# Patient Record
Sex: MALE | Race: BLACK OR AFRICAN AMERICAN | NOT HISPANIC OR LATINO | URBAN - METROPOLITAN AREA
[De-identification: names, ages, dates, MRNs, and addresses within clinical notes are randomized per-mention and may not be internally consistent; named-entity substitution may affect disease eponyms.]

---

## 2018-11-12 ENCOUNTER — INPATIENT HOSPITAL (AMBULATORY)
Dept: URBAN - METROPOLITAN AREA HOSPITAL 84 | Facility: HOSPITAL | Age: 48
End: 2018-11-12

## 2018-11-12 DIAGNOSIS — R10.32 LEFT LOWER QUADRANT PAIN: ICD-10-CM

## 2018-11-12 DIAGNOSIS — K57.32 DIVERTICULITIS OF LARGE INTESTINE WITHOUT PERFORATION OR ABS: ICD-10-CM

## 2018-11-12 DIAGNOSIS — R93.3 ABNORMAL FINDINGS ON DIAGNOSTIC IMAGING OF OTHER PARTS OF DI: ICD-10-CM

## 2018-11-12 DIAGNOSIS — Z87.19 PERSONAL HISTORY OF OTHER DISEASES OF THE DIGESTIVE SYSTEM: ICD-10-CM

## 2018-11-12 PROCEDURE — 99252 IP/OBS CONSLTJ NEW/EST SF 35: CPT | Performed by: NURSE PRACTITIONER

## 2018-11-13 ENCOUNTER — INPATIENT HOSPITAL (AMBULATORY)
Dept: URBAN - METROPOLITAN AREA HOSPITAL 84 | Facility: HOSPITAL | Age: 48
End: 2018-11-13

## 2018-11-13 DIAGNOSIS — K57.32 DIVERTICULITIS OF LARGE INTESTINE WITHOUT PERFORATION OR ABS: ICD-10-CM

## 2018-11-13 DIAGNOSIS — R10.32 LEFT LOWER QUADRANT PAIN: ICD-10-CM

## 2018-11-13 DIAGNOSIS — R93.3 ABNORMAL FINDINGS ON DIAGNOSTIC IMAGING OF OTHER PARTS OF DI: ICD-10-CM

## 2018-11-13 DIAGNOSIS — Z87.19 PERSONAL HISTORY OF OTHER DISEASES OF THE DIGESTIVE SYSTEM: ICD-10-CM

## 2018-11-13 PROCEDURE — 99231 SBSQ HOSP IP/OBS SF/LOW 25: CPT | Performed by: NURSE PRACTITIONER

## 2020-01-18 ENCOUNTER — HOSPITAL ENCOUNTER (EMERGENCY)
Facility: HOSPITAL | Age: 50
Discharge: HOME | End: 2020-01-18
Attending: EMERGENCY MEDICINE | Admitting: EMERGENCY MEDICINE

## 2020-01-18 ENCOUNTER — APPOINTMENT (OUTPATIENT)
Dept: CARDIOLOGY | Facility: HOSPITAL | Age: 50
End: 2020-01-18

## 2020-01-18 VITALS
RESPIRATION RATE: 18 BRPM | HEART RATE: 89 BPM | DIASTOLIC BLOOD PRESSURE: 95 MMHG | HEIGHT: 75 IN | WEIGHT: 297.4 LBS | BODY MASS INDEX: 36.98 KG/M2 | TEMPERATURE: 98.6 F | OXYGEN SATURATION: 98 % | SYSTOLIC BLOOD PRESSURE: 148 MMHG

## 2020-01-18 DIAGNOSIS — L02.415 ABSCESS OF RIGHT LEG: Primary | ICD-10-CM

## 2020-01-18 LAB
ANION GAP SERPL CALCULATED.3IONS-SCNC: 12 MMOL/L (ref 5–15)
APTT PPP: 28.7 SECONDS (ref 24–31)
BASOPHILS # BLD AUTO: 0.1 10*3/MM3 (ref 0–0.2)
BASOPHILS NFR BLD AUTO: 0.7 % (ref 0–1.5)
BH CV LOWER VASCULAR LEFT COMMON FEMORAL AUGMENT: NORMAL
BH CV LOWER VASCULAR LEFT COMMON FEMORAL COMPETENT: NORMAL
BH CV LOWER VASCULAR LEFT COMMON FEMORAL COMPRESS: NORMAL
BH CV LOWER VASCULAR LEFT COMMON FEMORAL PHASIC: NORMAL
BH CV LOWER VASCULAR LEFT COMMON FEMORAL SPONT: NORMAL
BH CV LOWER VASCULAR RIGHT COMMON FEMORAL AUGMENT: NORMAL
BH CV LOWER VASCULAR RIGHT COMMON FEMORAL COMPETENT: NORMAL
BH CV LOWER VASCULAR RIGHT COMMON FEMORAL COMPRESS: NORMAL
BH CV LOWER VASCULAR RIGHT COMMON FEMORAL PHASIC: NORMAL
BH CV LOWER VASCULAR RIGHT COMMON FEMORAL SPONT: NORMAL
BH CV LOWER VASCULAR RIGHT DISTAL FEMORAL COMPRESS: NORMAL
BH CV LOWER VASCULAR RIGHT GASTRONEMIUS COMPRESS: NORMAL
BH CV LOWER VASCULAR RIGHT GREATER SAPH AK COMPRESS: NORMAL
BH CV LOWER VASCULAR RIGHT GREATER SAPH BK COMPRESS: NORMAL
BH CV LOWER VASCULAR RIGHT LESSER SAPH COMPRESS: NORMAL
BH CV LOWER VASCULAR RIGHT MID FEMORAL AUGMENT: NORMAL
BH CV LOWER VASCULAR RIGHT MID FEMORAL COMPETENT: NORMAL
BH CV LOWER VASCULAR RIGHT MID FEMORAL COMPRESS: NORMAL
BH CV LOWER VASCULAR RIGHT MID FEMORAL PHASIC: NORMAL
BH CV LOWER VASCULAR RIGHT MID FEMORAL SPONT: NORMAL
BH CV LOWER VASCULAR RIGHT PERONEAL COMPRESS: NORMAL
BH CV LOWER VASCULAR RIGHT POPLITEAL AUGMENT: NORMAL
BH CV LOWER VASCULAR RIGHT POPLITEAL COMPETENT: NORMAL
BH CV LOWER VASCULAR RIGHT POPLITEAL COMPRESS: NORMAL
BH CV LOWER VASCULAR RIGHT POPLITEAL PHASIC: NORMAL
BH CV LOWER VASCULAR RIGHT POPLITEAL SPONT: NORMAL
BH CV LOWER VASCULAR RIGHT POSTERIOR TIBIAL COMPRESS: NORMAL
BH CV LOWER VASCULAR RIGHT PROXIMAL FEMORAL COMPRESS: NORMAL
BH CV LOWER VASCULAR RIGHT SAPHENOFEMORAL JUNCTION AUGMENT: NORMAL
BH CV LOWER VASCULAR RIGHT SAPHENOFEMORAL JUNCTION COMPETENT: NORMAL
BH CV LOWER VASCULAR RIGHT SAPHENOFEMORAL JUNCTION COMPRESS: NORMAL
BH CV LOWER VASCULAR RIGHT SAPHENOFEMORAL JUNCTION PHASIC: NORMAL
BH CV LOWER VASCULAR RIGHT SAPHENOFEMORAL JUNCTION SPONT: NORMAL
BUN BLD-MCNC: 13 MG/DL (ref 6–20)
BUN/CREAT SERPL: 11.5 (ref 7–25)
CALCIUM SPEC-SCNC: 9.4 MG/DL (ref 8.6–10.5)
CHLORIDE SERPL-SCNC: 103 MMOL/L (ref 98–107)
CO2 SERPL-SCNC: 26 MMOL/L (ref 22–29)
CREAT BLD-MCNC: 1.13 MG/DL (ref 0.76–1.27)
D DIMER PPP FEU-MCNC: 0.69 MCGFEU/ML (ref 0.17–0.59)
DEPRECATED RDW RBC AUTO: 43.8 FL (ref 37–54)
EOSINOPHIL # BLD AUTO: 0.1 10*3/MM3 (ref 0–0.4)
EOSINOPHIL NFR BLD AUTO: 1.1 % (ref 0.3–6.2)
ERYTHROCYTE [DISTWIDTH] IN BLOOD BY AUTOMATED COUNT: 14.1 % (ref 12.3–15.4)
GFR SERPL CREATININE-BSD FRML MDRD: 83 ML/MIN/1.73
GLUCOSE BLD-MCNC: 111 MG/DL (ref 65–99)
HCT VFR BLD AUTO: 43 % (ref 37.5–51)
HGB BLD-MCNC: 14.2 G/DL (ref 13–17.7)
INR PPP: 0.93 (ref 0.9–1.1)
LYMPHOCYTES # BLD AUTO: 2.5 10*3/MM3 (ref 0.7–3.1)
LYMPHOCYTES NFR BLD AUTO: 26.5 % (ref 19.6–45.3)
MCH RBC QN AUTO: 29.5 PG (ref 26.6–33)
MCHC RBC AUTO-ENTMCNC: 33.1 G/DL (ref 31.5–35.7)
MCV RBC AUTO: 89.2 FL (ref 79–97)
MONOCYTES # BLD AUTO: 0.7 10*3/MM3 (ref 0.1–0.9)
MONOCYTES NFR BLD AUTO: 7.3 % (ref 5–12)
NEUTROPHILS # BLD AUTO: 6.2 10*3/MM3 (ref 1.7–7)
NEUTROPHILS NFR BLD AUTO: 64.4 % (ref 42.7–76)
NRBC BLD AUTO-RTO: 0.5 /100 WBC (ref 0–0.2)
PLATELET # BLD AUTO: 294 10*3/MM3 (ref 140–450)
PMV BLD AUTO: 7.9 FL (ref 6–12)
POTASSIUM BLD-SCNC: 4.1 MMOL/L (ref 3.5–5.2)
PROTHROMBIN TIME: 9.9 SECONDS (ref 9.6–11.7)
RBC # BLD AUTO: 4.82 10*6/MM3 (ref 4.14–5.8)
SODIUM BLD-SCNC: 141 MMOL/L (ref 136–145)
WBC NRBC COR # BLD: 9.6 10*3/MM3 (ref 3.4–10.8)

## 2020-01-18 PROCEDURE — 80048 BASIC METABOLIC PNL TOTAL CA: CPT | Performed by: EMERGENCY MEDICINE

## 2020-01-18 PROCEDURE — 85379 FIBRIN DEGRADATION QUANT: CPT | Performed by: EMERGENCY MEDICINE

## 2020-01-18 PROCEDURE — 85025 COMPLETE CBC W/AUTO DIFF WBC: CPT | Performed by: EMERGENCY MEDICINE

## 2020-01-18 PROCEDURE — 85610 PROTHROMBIN TIME: CPT | Performed by: EMERGENCY MEDICINE

## 2020-01-18 PROCEDURE — 87205 SMEAR GRAM STAIN: CPT | Performed by: EMERGENCY MEDICINE

## 2020-01-18 PROCEDURE — 87070 CULTURE OTHR SPECIMN AEROBIC: CPT | Performed by: EMERGENCY MEDICINE

## 2020-01-18 PROCEDURE — 93971 EXTREMITY STUDY: CPT

## 2020-01-18 PROCEDURE — 85730 THROMBOPLASTIN TIME PARTIAL: CPT | Performed by: EMERGENCY MEDICINE

## 2020-01-18 PROCEDURE — 99283 EMERGENCY DEPT VISIT LOW MDM: CPT

## 2020-01-18 RX ORDER — CLINDAMYCIN HYDROCHLORIDE 300 MG/1
300 CAPSULE ORAL 3 TIMES DAILY
Qty: 30 CAPSULE | Refills: 0 | OUTPATIENT
Start: 2020-01-18 | End: 2020-12-28

## 2020-01-18 NOTE — ED NOTES
Called vascular and left a voicemail that this patient needs a RLE doppler as soon as the dept opens.     Lara Cabrera, RegSched Rep  01/18/20 0704

## 2020-01-18 NOTE — ED NOTES
Dr. Edmond performed I and D to RLE.  Packing placed,  Dressing applied.     Romana Montes RN  01/18/20 8591

## 2020-01-18 NOTE — DISCHARGE INSTRUCTIONS
Return to the ER for any worsening symptoms.  Initiate course of antibiotics for probable abscess.

## 2020-01-18 NOTE — ED PROVIDER NOTES
Subjective   Pleasant 49-year-old male complains of right medial ankle swelling increased for the past several days.  Patient did bump it on a nightstand several weeks ago with mild hematoma but did not develop any significant pain or redness or warmth until the past several days.  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  Pain in right leg is moderate, worse with ambulation.  No fevers.          Review of Systems   Musculoskeletal:        As per HPI   Skin: Positive for rash.   All other systems reviewed and are negative.      No past medical history on file.    Allergies   Allergen Reactions   • Codeine Other (See Comments)     Hypotension, tachycardia       No past surgical history on file.    No family history on file.    Social History     Socioeconomic History   • Marital status:      Spouse name: Not on file   • Number of children: Not on file   • Years of education: Not on file   • Highest education level: Not on file           Objective   Physical Exam   Constitutional: He is oriented to person, place, and time. He appears well-developed and well-nourished.   HENT:   Head: Normocephalic and atraumatic.   Mouth/Throat: Oropharynx is clear and moist.   Eyes: Pupils are equal, round, and reactive to light. Conjunctivae are normal.   Neck: Normal range of motion. Neck supple.   Cardiovascular: Normal rate, regular rhythm, normal heart sounds and intact distal pulses.   Pulmonary/Chest: Effort normal and breath sounds normal.   Musculoskeletal:   Right medial distal leg just above ankle with mild swelling, erythema, warmth, no wound, tender to palpation.  Negative Homans bilaterally   Neurological: He is alert and oriented to person, place, and time.   Motor and sensation intact   Skin: Skin is warm and dry. Capillary refill takes less than 2 seconds.   Psychiatric: He has a normal mood and affect. His  behavior is normal.       Incision & Drainage  Date/Time: 1/18/2020 6:50 AM  Performed by: Vinh Edmond MD  Authorized by: Vinh Edmond MD     Consent:     Consent obtained:  Verbal    Alternatives discussed:  Delayed treatment  Location:     Type:  Abscess    Location:  Lower extremity    Lower extremity location:  Leg    Leg location:  R lower leg  Pre-procedure details:     Skin preparation:  Hibiclens  Anesthesia (see MAR for exact dosages):     Anesthesia method:  Local infiltration    Local anesthetic:  Lidocaine 1% WITH epi  Procedure type:     Complexity:  Simple  Procedure details:     Needle aspiration: yes      Needle size:  18 G    Incision types:  Single straight    Incision depth:  Subcutaneous    Scalpel blade:  11    Wound management:  Probed and deloculated    Drainage:  Purulent and bloody    Drainage amount:  Scant    Wound treatment:  Drain placed    Packing materials:  1/4 in iodoform gauze  Post-procedure details:     Patient tolerance of procedure:  Tolerated well, no immediate complications               ED Course  ED Course as of Jan 18 0831   Sat Jan 18, 2020   0723 Duplex US pending at time care was transferred to yanelis HOFFMAN at 0730    [MG]      ED Course User Index  [MG] Yanelis Johnson PA-C    No radiology results for the last day  Labs Reviewed   BASIC METABOLIC PANEL - Abnormal; Notable for the following components:       Result Value    Glucose 111 (*)     All other components within normal limits    Narrative:     GFR Normal >60  Chronic Kidney Disease <60  Kidney Failure <15     D-DIMER, QUANTITATIVE - Abnormal; Notable for the following components:    D-Dimer, Quantitative 0.69 (*)     All other components within normal limits    Narrative:     Reference Range  --------------------------------------------------------------------     < 0.50   Negative Predictive Value  0.50-0.59   Indeterminate    >= 0.60   Probable VTE             A very low percentage of patients with  DVT may yield D-Dimer results   below the cut-off of 0.50 MCGFEU/mL.  This is known to be more   prevalent in patients with distal DVT.             Results of this test should always be interpreted in conjunction with   the patient's medical history, clinical presentation and other   findings.  Clinical diagnosis should not be based on the result of   INNOVANCE D-Dimer alone.   CBC WITH AUTO DIFFERENTIAL - Abnormal; Notable for the following components:    nRBC 0.5 (*)     All other components within normal limits   PROTIME-INR - Normal   APTT - Normal   WOUND CULTURE   CBC AND DIFFERENTIAL    Narrative:     The following orders were created for panel order CBC & Differential.  Procedure                               Abnormality         Status                     ---------                               -----------         ------                     CBC Auto Differential[[id number]]        Abnormal            Final result                 Please view results for these tests on the individual orders.     Medications - No data to display                                             MDM  Number of Diagnoses or Management Options  Abscess of right leg:   Diagnosis management comments: DISPOSITION:   Chart Review:  Comorbidity:  has no past medical history on file.  Differentials:this list is not all inclusive and does not constitute the entirety of considered causes --> abscess, DVT, cellulitis  Labs:  Glucose 111, d-dmer elevated, CBC unremarkable,     Imaging: Was interpreted by physician and reviewed by myself:  No radiology results for the last day    Disposition/Treatment:  49-year-old male who presents to the ER with leg swelling.  He is a long-distance .  Care was assumed from Dr. Edmond at 7:30 AM.  Pending ultrasound results which were negative for DVT.  Patient was discharged home with antibiotics for probable abscess.  He was stable at time of discharge return precaution follow-up instruction provided  he was in agreement with plan       Amount and/or Complexity of Data Reviewed  Clinical lab tests: reviewed    Patient Progress  Patient progress: stable      Final diagnoses:   Abscess of right leg            Sepideh Johnson PA-C  01/18/20 0878

## 2020-01-18 NOTE — ED NOTES
Patient complains of pain and swelling to RLE.  Patient has redness noted to R calf.  States he hit his leg about 1 month ago in the red area.  States redness and pain became worse this week.  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

## 2020-01-21 LAB
BACTERIA SPEC AEROBE CULT: NORMAL
GRAM STN SPEC: NORMAL
GRAM STN SPEC: NORMAL

## 2020-12-28 ENCOUNTER — APPOINTMENT (OUTPATIENT)
Dept: CT IMAGING | Facility: HOSPITAL | Age: 50
End: 2020-12-28

## 2020-12-28 ENCOUNTER — HOSPITAL ENCOUNTER (EMERGENCY)
Facility: HOSPITAL | Age: 50
Discharge: HOME | End: 2020-12-28
Attending: EMERGENCY MEDICINE | Admitting: EMERGENCY MEDICINE

## 2020-12-28 VITALS
HEART RATE: 79 BPM | OXYGEN SATURATION: 100 % | BODY MASS INDEX: 35.03 KG/M2 | HEIGHT: 75 IN | RESPIRATION RATE: 18 BRPM | SYSTOLIC BLOOD PRESSURE: 122 MMHG | DIASTOLIC BLOOD PRESSURE: 86 MMHG | WEIGHT: 281.75 LBS | TEMPERATURE: 98 F

## 2020-12-28 DIAGNOSIS — K57.92 DIVERTICULITIS: Primary | ICD-10-CM

## 2020-12-28 LAB
ALBUMIN SERPL-MCNC: 4.4 G/DL (ref 3.5–5.2)
ALBUMIN/GLOB SERPL: 1.8 G/DL
ALP SERPL-CCNC: 42 U/L (ref 39–117)
ALT SERPL W P-5'-P-CCNC: 18 U/L (ref 1–41)
ANION GAP SERPL CALCULATED.3IONS-SCNC: 11 MMOL/L (ref 5–15)
AST SERPL-CCNC: 19 U/L (ref 1–40)
BASOPHILS # BLD AUTO: 0.1 10*3/MM3 (ref 0–0.2)
BASOPHILS NFR BLD AUTO: 0.7 % (ref 0–1.5)
BILIRUB SERPL-MCNC: 0.3 MG/DL (ref 0–1.2)
BILIRUB UR QL STRIP: NEGATIVE
BUN SERPL-MCNC: 10 MG/DL (ref 6–20)
BUN/CREAT SERPL: 9.9 (ref 7–25)
CALCIUM SPEC-SCNC: 9.3 MG/DL (ref 8.6–10.5)
CHLORIDE SERPL-SCNC: 103 MMOL/L (ref 98–107)
CLARITY UR: CLEAR
CO2 SERPL-SCNC: 26 MMOL/L (ref 22–29)
COLOR UR: YELLOW
CREAT SERPL-MCNC: 1.01 MG/DL (ref 0.76–1.27)
DEPRECATED RDW RBC AUTO: 43.3 FL (ref 37–54)
EOSINOPHIL # BLD AUTO: 0.1 10*3/MM3 (ref 0–0.4)
EOSINOPHIL NFR BLD AUTO: 1.1 % (ref 0.3–6.2)
ERYTHROCYTE [DISTWIDTH] IN BLOOD BY AUTOMATED COUNT: 14.2 % (ref 12.3–15.4)
GFR SERPL CREATININE-BSD FRML MDRD: 94 ML/MIN/1.73
GLOBULIN UR ELPH-MCNC: 2.4 GM/DL
GLUCOSE SERPL-MCNC: 107 MG/DL (ref 65–99)
GLUCOSE UR STRIP-MCNC: NEGATIVE MG/DL
HCT VFR BLD AUTO: 43.8 % (ref 37.5–51)
HGB BLD-MCNC: 14.5 G/DL (ref 13–17.7)
HGB UR QL STRIP.AUTO: NEGATIVE
HOLD SPECIMEN: NORMAL
KETONES UR QL STRIP: NEGATIVE
LEUKOCYTE ESTERASE UR QL STRIP.AUTO: NEGATIVE
LIPASE SERPL-CCNC: 10 U/L (ref 13–60)
LYMPHOCYTES # BLD AUTO: 3.2 10*3/MM3 (ref 0.7–3.1)
LYMPHOCYTES NFR BLD AUTO: 42.1 % (ref 19.6–45.3)
MCH RBC QN AUTO: 29.3 PG (ref 26.6–33)
MCHC RBC AUTO-ENTMCNC: 33.1 G/DL (ref 31.5–35.7)
MCV RBC AUTO: 88.4 FL (ref 79–97)
MONOCYTES # BLD AUTO: 0.5 10*3/MM3 (ref 0.1–0.9)
MONOCYTES NFR BLD AUTO: 6.8 % (ref 5–12)
NEUTROPHILS NFR BLD AUTO: 3.7 10*3/MM3 (ref 1.7–7)
NEUTROPHILS NFR BLD AUTO: 49.3 % (ref 42.7–76)
NITRITE UR QL STRIP: NEGATIVE
NRBC BLD AUTO-RTO: 0.1 /100 WBC (ref 0–0.2)
PH UR STRIP.AUTO: 7 [PH] (ref 5–8)
PLATELET # BLD AUTO: 259 10*3/MM3 (ref 140–450)
PMV BLD AUTO: 7.7 FL (ref 6–12)
POTASSIUM SERPL-SCNC: 4 MMOL/L (ref 3.5–5.2)
PROT SERPL-MCNC: 6.8 G/DL (ref 6–8.5)
PROT UR QL STRIP: NEGATIVE
RBC # BLD AUTO: 4.95 10*6/MM3 (ref 4.14–5.8)
SODIUM SERPL-SCNC: 140 MMOL/L (ref 136–145)
SP GR UR STRIP: 1.02 (ref 1–1.03)
UROBILINOGEN UR QL STRIP: NORMAL
WBC # BLD AUTO: 7.5 10*3/MM3 (ref 3.4–10.8)
WHOLE BLOOD HOLD SPECIMEN: NORMAL

## 2020-12-28 PROCEDURE — 74177 CT ABD & PELVIS W/CONTRAST: CPT

## 2020-12-28 PROCEDURE — 96375 TX/PRO/DX INJ NEW DRUG ADDON: CPT

## 2020-12-28 PROCEDURE — 83690 ASSAY OF LIPASE: CPT | Performed by: EMERGENCY MEDICINE

## 2020-12-28 PROCEDURE — 99283 EMERGENCY DEPT VISIT LOW MDM: CPT

## 2020-12-28 PROCEDURE — 25010000002 MORPHINE PER 10 MG

## 2020-12-28 PROCEDURE — 96374 THER/PROPH/DIAG INJ IV PUSH: CPT

## 2020-12-28 PROCEDURE — 25010000002 MORPHINE PER 10 MG: Performed by: PHYSICIAN ASSISTANT

## 2020-12-28 PROCEDURE — 81003 URINALYSIS AUTO W/O SCOPE: CPT | Performed by: EMERGENCY MEDICINE

## 2020-12-28 PROCEDURE — 85025 COMPLETE CBC W/AUTO DIFF WBC: CPT | Performed by: EMERGENCY MEDICINE

## 2020-12-28 PROCEDURE — 96376 TX/PRO/DX INJ SAME DRUG ADON: CPT

## 2020-12-28 PROCEDURE — 25010000002 ONDANSETRON PER 1 MG: Performed by: EMERGENCY MEDICINE

## 2020-12-28 PROCEDURE — 80053 COMPREHEN METABOLIC PANEL: CPT | Performed by: EMERGENCY MEDICINE

## 2020-12-28 PROCEDURE — 0 IOPAMIDOL PER 1 ML: Performed by: EMERGENCY MEDICINE

## 2020-12-28 RX ORDER — AMOXICILLIN AND CLAVULANATE POTASSIUM 875; 125 MG/1; MG/1
1 TABLET, FILM COATED ORAL 3 TIMES DAILY
Qty: 21 TABLET | Refills: 0 | Status: SHIPPED | OUTPATIENT
Start: 2020-12-28 | End: 2021-01-04

## 2020-12-28 RX ORDER — MORPHINE SULFATE 4 MG/ML
4 INJECTION, SOLUTION INTRAMUSCULAR; INTRAVENOUS ONCE
Status: COMPLETED | OUTPATIENT
Start: 2020-12-28 | End: 2020-12-28

## 2020-12-28 RX ORDER — ONDANSETRON 4 MG/1
4 TABLET, ORALLY DISINTEGRATING ORAL EVERY 8 HOURS PRN
Qty: 20 TABLET | Refills: 0 | OUTPATIENT
Start: 2020-12-28 | End: 2021-07-06

## 2020-12-28 RX ORDER — MORPHINE SULFATE 4 MG/ML
INJECTION, SOLUTION INTRAMUSCULAR; INTRAVENOUS
Status: COMPLETED
Start: 2020-12-28 | End: 2020-12-28

## 2020-12-28 RX ORDER — MORPHINE SULFATE 4 MG/ML
2 INJECTION, SOLUTION INTRAMUSCULAR; INTRAVENOUS ONCE
Status: COMPLETED | OUTPATIENT
Start: 2020-12-28 | End: 2020-12-28

## 2020-12-28 RX ORDER — ONDANSETRON 2 MG/ML
4 INJECTION INTRAMUSCULAR; INTRAVENOUS ONCE
Status: COMPLETED | OUTPATIENT
Start: 2020-12-28 | End: 2020-12-28

## 2020-12-28 RX ORDER — SODIUM CHLORIDE 0.9 % (FLUSH) 0.9 %
10 SYRINGE (ML) INJECTION AS NEEDED
Status: DISCONTINUED | OUTPATIENT
Start: 2020-12-28 | End: 2020-12-28 | Stop reason: HOSPADM

## 2020-12-28 RX ORDER — HYDROCODONE BITARTRATE AND ACETAMINOPHEN 5; 325 MG/1; MG/1
1 TABLET ORAL EVERY 6 HOURS PRN
Qty: 8 TABLET | Refills: 0 | OUTPATIENT
Start: 2020-12-28 | End: 2021-07-06

## 2020-12-28 RX ADMIN — MORPHINE SULFATE 2 MG: 4 INJECTION INTRAVENOUS at 08:29

## 2020-12-28 RX ADMIN — ONDANSETRON 4 MG: 2 INJECTION, SOLUTION INTRAMUSCULAR; INTRAVENOUS at 05:49

## 2020-12-28 RX ADMIN — IOPAMIDOL 100 ML: 755 INJECTION, SOLUTION INTRAVENOUS at 07:25

## 2020-12-28 RX ADMIN — MORPHINE SULFATE 4 MG: 4 INJECTION INTRAVENOUS at 05:49

## 2020-12-28 RX ADMIN — MORPHINE SULFATE 4 MG: 4 INJECTION, SOLUTION INTRAMUSCULAR; INTRAVENOUS at 05:49

## 2020-12-28 NOTE — DISCHARGE INSTRUCTIONS
Return to the ER for any worsening symptoms.   Follow up with your primary care provider for any further work up and pain management  May also follow up with GI for further management

## 2020-12-28 NOTE — ED PROVIDER NOTES
Subjective   50-year-old male with a history of recurrent diverticulitis complains of suspected flare with moderate left lower quadrant pain, nausea, nonbloody diarrhea for the past 1 week.  Pain worse with movement.          Review of Systems   Gastrointestinal: Positive for abdominal pain, diarrhea and nausea.   All other systems reviewed and are negative.      No past medical history on file.    Allergies   Allergen Reactions   • Codeine Other (See Comments)     Hypotension, tachycardia       No past surgical history on file.    No family history on file.    Social History     Socioeconomic History   • Marital status:      Spouse name: Not on file   • Number of children: Not on file   • Years of education: Not on file   • Highest education level: Not on file           Objective   Physical Exam  Constitutional:       Appearance: He is well-developed.   HENT:      Head: Normocephalic and atraumatic.      Mouth/Throat:      Mouth: Mucous membranes are moist.      Pharynx: Oropharynx is clear.   Eyes:      Conjunctiva/sclera: Conjunctivae normal.      Pupils: Pupils are equal, round, and reactive to light.   Neck:      Musculoskeletal: Normal range of motion and neck supple.   Cardiovascular:      Rate and Rhythm: Normal rate and regular rhythm.   Pulmonary:      Effort: Pulmonary effort is normal.      Breath sounds: Normal breath sounds.   Abdominal:      General: Bowel sounds are normal. There is no distension.      Palpations: Abdomen is soft.      Comments: Tenderness to palpation left lower abdomen, no rebound or guarding   Musculoskeletal: Normal range of motion.         General: No swelling or tenderness.   Skin:     General: Skin is warm and dry.      Capillary Refill: Capillary refill takes less than 2 seconds.   Neurological:      General: No focal deficit present.      Mental Status: He is alert and oriented to person, place, and time.   Psychiatric:         Mood and Affect: Mood normal.          Behavior: Behavior normal.         Procedures           ED Course  ED Course as of Dec 28 0824   Mon Dec 28, 2020   0736 Care transferred to myself pending CT of abdomen pelvis. Lab work stable. Patient resting comfortably in room at this time. Agree with the above assessment     [MG]   0823 Inspect performed and negative for [place]/kY    [MG]      ED Course User Index  [MG] Johnson Sepideh LORE, CAROLYNE         abs  Medications   sodium chloride 0.9 % flush 10 mL (has no administration in time range)   Morphine sulfate (PF) injection 2 mg (has no administration in time range)   Morphine sulfate (PF) injection 4 mg (4 mg Intravenous Given 12/28/20 0549)   ondansetron (ZOFRAN) injection 4 mg (4 mg Intravenous Given 12/28/20 0549)   iopamidol (ISOVUE-370) 76 % injection 100 mL (100 mL Intravenous Given 12/28/20 0725)                                       MDM  Number of Diagnoses or Management Options  Diverticulitis:   Diagnosis management comments: I examined the patient using the appropriate personal protective equipment.      DISPOSITION:   Chart Review:  Comorbidity:  has no past medical history on file.    Imaging: Was interpreted by physician and reviewed by myself:  Ct Abdomen Pelvis With Contrast    Result Date: 12/28/2020  1. Mild acute sigmoid diverticulitis without evidence of perforation or abscess. 2. Small recurrent ventral hernia to the left and superior to patient's mesh repair.    Electronically Signed By-Eric Hodge MD On:12/28/2020 8:01 AM This report was finalized on [id number] by  Eric Hodge MD.      Disposition/Treatment:    50-year-old male presents to the ER left lower quadrant pain history of diverticulitis patient's lab work was normal he does have mild diverticulitis on CT scan.  I discussed with patient trial course outpatient therapy he was amendable to plan.  He was given Augmentin antinausea medication and pain medication told return to the ER for any worsening symptoms he was  stable at time of discharge return precaution follow-up return provided he was stable and in agreement with plan    Assumed care from Dr. Edmond initial lab work already obtained pending CT results.  I agree with his above assessment including HPI ROS and physical exam patient was resting comfortably when I did give him his results.       Amount and/or Complexity of Data Reviewed  Clinical lab tests: reviewed  Tests in the radiology section of CPT®: reviewed    Patient Progress  Patient progress: stable      Final diagnoses:   Diverticulitis            Sepideh Johnson PA-C  12/28/20 0825       Vinh Edmond MD  12/29/20 0842

## 2021-07-06 ENCOUNTER — HOSPITAL ENCOUNTER (EMERGENCY)
Facility: HOSPITAL | Age: 51
Discharge: HOME | End: 2021-07-06
Attending: EMERGENCY MEDICINE | Admitting: EMERGENCY MEDICINE

## 2021-07-06 ENCOUNTER — APPOINTMENT (OUTPATIENT)
Dept: CT IMAGING | Facility: HOSPITAL | Age: 51
End: 2021-07-06

## 2021-07-06 VITALS
BODY MASS INDEX: 36.1 KG/M2 | DIASTOLIC BLOOD PRESSURE: 90 MMHG | OXYGEN SATURATION: 100 % | HEART RATE: 90 BPM | WEIGHT: 290.35 LBS | HEIGHT: 75 IN | RESPIRATION RATE: 16 BRPM | SYSTOLIC BLOOD PRESSURE: 136 MMHG | TEMPERATURE: 98.6 F

## 2021-07-06 DIAGNOSIS — K57.92 DIVERTICULITIS: Primary | ICD-10-CM

## 2021-07-06 LAB
ALBUMIN SERPL-MCNC: 3.9 G/DL (ref 3.5–5.2)
ALBUMIN/GLOB SERPL: 1.7 G/DL
ALP SERPL-CCNC: 46 U/L (ref 39–117)
ALT SERPL W P-5'-P-CCNC: 15 U/L (ref 1–41)
ANION GAP SERPL CALCULATED.3IONS-SCNC: 8 MMOL/L (ref 5–15)
AST SERPL-CCNC: 19 U/L (ref 1–40)
BASOPHILS # BLD AUTO: 0.1 10*3/MM3 (ref 0–0.2)
BASOPHILS NFR BLD AUTO: 1.2 % (ref 0–1.5)
BILIRUB SERPL-MCNC: 0.4 MG/DL (ref 0–1.2)
BILIRUB UR QL STRIP: NEGATIVE
BUN SERPL-MCNC: 8 MG/DL (ref 6–20)
BUN/CREAT SERPL: 7.3 (ref 7–25)
CALCIUM SPEC-SCNC: 8.6 MG/DL (ref 8.6–10.5)
CHLORIDE SERPL-SCNC: 106 MMOL/L (ref 98–107)
CLARITY UR: CLEAR
CO2 SERPL-SCNC: 28 MMOL/L (ref 22–29)
COLOR UR: ABNORMAL
CREAT SERPL-MCNC: 1.09 MG/DL (ref 0.76–1.27)
DEPRECATED RDW RBC AUTO: 42.4 FL (ref 37–54)
EOSINOPHIL # BLD AUTO: 0.1 10*3/MM3 (ref 0–0.4)
EOSINOPHIL NFR BLD AUTO: 1.3 % (ref 0.3–6.2)
ERYTHROCYTE [DISTWIDTH] IN BLOOD BY AUTOMATED COUNT: 13.8 % (ref 12.3–15.4)
GFR SERPL CREATININE-BSD FRML MDRD: 86 ML/MIN/1.73
GLOBULIN UR ELPH-MCNC: 2.3 GM/DL
GLUCOSE SERPL-MCNC: 121 MG/DL (ref 65–99)
GLUCOSE UR STRIP-MCNC: NEGATIVE MG/DL
HCT VFR BLD AUTO: 42.5 % (ref 37.5–51)
HGB BLD-MCNC: 14.2 G/DL (ref 13–17.7)
HGB UR QL STRIP.AUTO: NEGATIVE
KETONES UR QL STRIP: NEGATIVE
LEUKOCYTE ESTERASE UR QL STRIP.AUTO: NEGATIVE
LIPASE SERPL-CCNC: 7 U/L (ref 13–60)
LYMPHOCYTES # BLD AUTO: 2.8 10*3/MM3 (ref 0.7–3.1)
LYMPHOCYTES NFR BLD AUTO: 39.9 % (ref 19.6–45.3)
MCH RBC QN AUTO: 29.2 PG (ref 26.6–33)
MCHC RBC AUTO-ENTMCNC: 33.3 G/DL (ref 31.5–35.7)
MCV RBC AUTO: 87.5 FL (ref 79–97)
MONOCYTES # BLD AUTO: 0.6 10*3/MM3 (ref 0.1–0.9)
MONOCYTES NFR BLD AUTO: 8 % (ref 5–12)
NEUTROPHILS NFR BLD AUTO: 3.4 10*3/MM3 (ref 1.7–7)
NEUTROPHILS NFR BLD AUTO: 49.6 % (ref 42.7–76)
NITRITE UR QL STRIP: NEGATIVE
NRBC BLD AUTO-RTO: 0.2 /100 WBC (ref 0–0.2)
PH UR STRIP.AUTO: 7 [PH] (ref 5–8)
PLATELET # BLD AUTO: 227 10*3/MM3 (ref 140–450)
PMV BLD AUTO: 8.1 FL (ref 6–12)
POTASSIUM SERPL-SCNC: 4 MMOL/L (ref 3.5–5.2)
PROT SERPL-MCNC: 6.2 G/DL (ref 6–8.5)
PROT UR QL STRIP: NEGATIVE
RBC # BLD AUTO: 4.86 10*6/MM3 (ref 4.14–5.8)
SODIUM SERPL-SCNC: 142 MMOL/L (ref 136–145)
SP GR UR STRIP: 1.02 (ref 1–1.03)
UROBILINOGEN UR QL STRIP: ABNORMAL
WBC # BLD AUTO: 6.9 10*3/MM3 (ref 3.4–10.8)

## 2021-07-06 PROCEDURE — 25010000002 ONDANSETRON PER 1 MG: Performed by: EMERGENCY MEDICINE

## 2021-07-06 PROCEDURE — 83690 ASSAY OF LIPASE: CPT | Performed by: EMERGENCY MEDICINE

## 2021-07-06 PROCEDURE — 96376 TX/PRO/DX INJ SAME DRUG ADON: CPT

## 2021-07-06 PROCEDURE — 96374 THER/PROPH/DIAG INJ IV PUSH: CPT

## 2021-07-06 PROCEDURE — 0 IOPAMIDOL PER 1 ML: Performed by: EMERGENCY MEDICINE

## 2021-07-06 PROCEDURE — 80053 COMPREHEN METABOLIC PANEL: CPT | Performed by: EMERGENCY MEDICINE

## 2021-07-06 PROCEDURE — 74177 CT ABD & PELVIS W/CONTRAST: CPT

## 2021-07-06 PROCEDURE — 25010000002 CEFTRIAXONE PER 250 MG: Performed by: EMERGENCY MEDICINE

## 2021-07-06 PROCEDURE — 96375 TX/PRO/DX INJ NEW DRUG ADDON: CPT

## 2021-07-06 PROCEDURE — 85025 COMPLETE CBC W/AUTO DIFF WBC: CPT | Performed by: EMERGENCY MEDICINE

## 2021-07-06 PROCEDURE — 25010000002 MORPHINE PER 10 MG: Performed by: EMERGENCY MEDICINE

## 2021-07-06 PROCEDURE — 99284 EMERGENCY DEPT VISIT MOD MDM: CPT

## 2021-07-06 PROCEDURE — 81003 URINALYSIS AUTO W/O SCOPE: CPT | Performed by: EMERGENCY MEDICINE

## 2021-07-06 RX ORDER — MORPHINE SULFATE 4 MG/ML
4 INJECTION, SOLUTION INTRAMUSCULAR; INTRAVENOUS ONCE
Status: COMPLETED | OUTPATIENT
Start: 2021-07-06 | End: 2021-07-06

## 2021-07-06 RX ORDER — HYDROCODONE BITARTRATE AND ACETAMINOPHEN 5; 325 MG/1; MG/1
1 TABLET ORAL EVERY 6 HOURS PRN
Qty: 12 TABLET | Refills: 0 | OUTPATIENT
Start: 2021-07-06 | End: 2021-08-11

## 2021-07-06 RX ORDER — AMOXICILLIN AND CLAVULANATE POTASSIUM 875; 125 MG/1; MG/1
1 TABLET, FILM COATED ORAL 2 TIMES DAILY
Qty: 20 TABLET | Refills: 0 | Status: SHIPPED | OUTPATIENT
Start: 2021-07-06 | End: 2021-08-11 | Stop reason: SDUPTHER

## 2021-07-06 RX ORDER — SODIUM CHLORIDE 0.9 % (FLUSH) 0.9 %
10 SYRINGE (ML) INJECTION AS NEEDED
Status: DISCONTINUED | OUTPATIENT
Start: 2021-07-06 | End: 2021-07-06 | Stop reason: HOSPADM

## 2021-07-06 RX ORDER — ONDANSETRON 2 MG/ML
4 INJECTION INTRAMUSCULAR; INTRAVENOUS ONCE
Status: COMPLETED | OUTPATIENT
Start: 2021-07-06 | End: 2021-07-06

## 2021-07-06 RX ADMIN — ONDANSETRON 4 MG: 2 INJECTION INTRAMUSCULAR; INTRAVENOUS at 09:33

## 2021-07-06 RX ADMIN — MORPHINE SULFATE 4 MG: 4 INJECTION INTRAVENOUS at 09:33

## 2021-07-06 RX ADMIN — IOPAMIDOL 100 ML: 755 INJECTION, SOLUTION INTRAVENOUS at 10:25

## 2021-07-06 RX ADMIN — ONDANSETRON 4 MG: 2 INJECTION INTRAMUSCULAR; INTRAVENOUS at 11:15

## 2021-07-06 RX ADMIN — WATER 1 G: 1000 INJECTION, SOLUTION INTRAVENOUS at 11:06

## 2021-07-06 RX ADMIN — MORPHINE SULFATE 4 MG: 4 INJECTION INTRAVENOUS at 11:14

## 2021-07-06 RX ADMIN — SODIUM CHLORIDE, POTASSIUM CHLORIDE, SODIUM LACTATE AND CALCIUM CHLORIDE 1000 ML: 600; 310; 30; 20 INJECTION, SOLUTION INTRAVENOUS at 09:32

## 2021-07-06 NOTE — ED NOTES
"C/o left sided abd pain with nausea states\" I'm having a flair up of my diverticulitis\"     Sepideh Morejon RN  07/06/21 5669    "

## 2021-07-06 NOTE — ED PROVIDER NOTES
Subjective   Chief complaint abdominal pain    History present illness 50-year-old male who states he has had some left lower abdominal pain is cramping in nature nonradiating continuous moderate to severe over the last 14 hours.  Is gradually gotten worse.  He denies any fever chills or sweats no urinary problems no black or bloody stool.  States it feels like previous diverticulitis.  Nothing makes it better nothing makes it worse no one at home with similar illness no foreign travels.  He had pneumonia in March and take antibiotics for that.  No other associated symptoms          Review of Systems   Constitutional: Negative for chills and fever.   HENT: Negative for congestion and sinus pressure.    Respiratory: Negative for chest tightness and shortness of breath.    Cardiovascular: Negative for chest pain and palpitations.   Gastrointestinal: Positive for abdominal pain. Negative for vomiting.   Endocrine: Negative for cold intolerance and heat intolerance.   Genitourinary: Negative for difficulty urinating and dysuria.   Musculoskeletal: Negative for arthralgias and back pain.   Skin: Negative for color change and rash.   Neurological: Negative for dizziness and light-headedness.   Psychiatric/Behavioral: Negative for agitation and behavioral problems.       No past medical history on file.  Hypertension  Allergies   Allergen Reactions   • Codeine Other (See Comments)     Hypotension, tachycardia       No past surgical history on file.    No family history on file.    Social History     Socioeconomic History   • Marital status:      Spouse name: Not on file   • Number of children: Not on file   • Years of education: Not on file   • Highest education level: Not on file     Prior to Admission medications    Medication Sig Start Date End Date Taking? Authorizing Provider   HYDROcodone-acetaminophen (Norco) 5-325 MG per tablet Take 1 tablet by mouth Every 6 (Six) Hours As Needed for Severe Pain . 12/28/20    Sepideh Johnson PA-C   ondansetron ODT (ZOFRAN-ODT) 4 MG disintegrating tablet Place 1 tablet on the tongue Every 8 (Eight) Hours As Needed for Nausea or Vomiting. 12/28/20   Sepideh Johnson PA-C     Patient does not take hydrocodone or Zofran.  Patient is on blood pressure medication      Objective   Physical Exam  50-year-old awake alert no distress HEENT extraocular muscles intact pupils equal and reactive mouth clear  Neck supple no adenopathy  Lungs clear no retractions  Back no CVA tenderness  Heart regular without murmur  Abdomen soft mild left lower abdominal tenderness no rebound no guarding good bowel sounds no peritoneal findings or pulsatile mass bruits  Extremities no edema cords or Homans' sign or evidence of DVT.  Skin warm dry without rashes  Neurologic awake alert follows commands motor strength normal without focal weakness  Procedures           ED Course      Results for orders placed or performed during the hospital encounter of 07/06/21   Comprehensive Metabolic Panel    Specimen: Blood   Result Value Ref Range    Glucose 121 (H) 65 - 99 mg/dL    BUN 8 6 - 20 mg/dL    Creatinine 1.09 0.76 - 1.27 mg/dL    Sodium 142 136 - 145 mmol/L    Potassium 4.0 3.5 - 5.2 mmol/L    Chloride 106 98 - 107 mmol/L    CO2 28.0 22.0 - 29.0 mmol/L    Calcium 8.6 8.6 - 10.5 mg/dL    Total Protein 6.2 6.0 - 8.5 g/dL    Albumin 3.90 3.50 - 5.20 g/dL    ALT (SGPT) 15 1 - 41 U/L    AST (SGOT) 19 1 - 40 U/L    Alkaline Phosphatase 46 39 - 117 U/L    Total Bilirubin 0.4 0.0 - 1.2 mg/dL    eGFR  African Amer 86 >60 mL/min/1.73    Globulin 2.3 gm/dL    A/G Ratio 1.7 g/dL    BUN/Creatinine Ratio 7.3 7.0 - 25.0    Anion Gap 8.0 5.0 - 15.0 mmol/L   Lipase    Specimen: Blood   Result Value Ref Range    Lipase 7 (L) 13 - 60 U/L   Urinalysis With Microscopic If Indicated (No Culture) - Urine, Clean Catch    Specimen: Urine, Clean Catch   Result Value Ref Range    Color, UA Dark Yellow (A) Yellow, Straw    Appearance, UA Clear  Clear    pH, UA 7.0 5.0 - 8.0    Specific Gravity, UA 1.024 1.005 - 1.030    Glucose, UA Negative Negative    Ketones, UA Negative Negative    Bilirubin, UA Negative Negative    Blood, UA Negative Negative    Protein, UA Negative Negative    Leuk Esterase, UA Negative Negative    Nitrite, UA Negative Negative    Urobilinogen, UA 0.2 E.U./dL 0.2 - 1.0 E.U./dL   CBC Auto Differential    Specimen: Blood   Result Value Ref Range    WBC 6.90 3.40 - 10.80 10*3/mm3    RBC 4.86 4.14 - 5.80 10*6/mm3    Hemoglobin 14.2 13.0 - 17.7 g/dL    Hematocrit 42.5 37.5 - 51.0 %    MCV 87.5 79.0 - 97.0 fL    MCH 29.2 26.6 - 33.0 pg    MCHC 33.3 31.5 - 35.7 g/dL    RDW 13.8 12.3 - 15.4 %    RDW-SD 42.4 37.0 - 54.0 fl    MPV 8.1 6.0 - 12.0 fL    Platelets 227 140 - 450 10*3/mm3    Neutrophil % 49.6 42.7 - 76.0 %    Lymphocyte % 39.9 19.6 - 45.3 %    Monocyte % 8.0 5.0 - 12.0 %    Eosinophil % 1.3 0.3 - 6.2 %    Basophil % 1.2 0.0 - 1.5 %    Neutrophils, Absolute 3.40 1.70 - 7.00 10*3/mm3    Lymphocytes, Absolute 2.80 0.70 - 3.10 10*3/mm3    Monocytes, Absolute 0.60 0.10 - 0.90 10*3/mm3    Eosinophils, Absolute 0.10 0.00 - 0.40 10*3/mm3    Basophils, Absolute 0.10 0.00 - 0.20 10*3/mm3    nRBC 0.2 0.0 - 0.2 /100 WBC     CT Abdomen Pelvis With Contrast    Result Date: 7/6/2021   1. Abnormal segmental narrowing, wall thickening, and fat stranding surrounding the proximal sigmoid colon. The findings are favored to reflect changes of focal acute diverticulitis. Similar features can be seen on 12/28/2020. There is no indication of high-grade upstream bowel obstruction. Colonic luminal narrowing may be related to active inflammation, underlying colonic malignancy cannot be completely excluded but is thought less likely..Consider short-term imaging follow-up to confirm improvement or resolution of the findings. 2. No evidence of bowel perforation or abscess. 3. Stable left supraumbilical ventral hernia containing fat. Signs of prior ventral  abdominal hernia repair. 4. Indeterminate 1.6 x 1.6 x 2.1 cm low-density at the right lateral prostatic apex. Correlate with PSA levels.    Electronically Signed By-Chayito Sanchez MD On:7/6/2021 10:45 AM This report was finalized on [id number] by  Chayito Sanchez MD.    Medications   lactated ringers bolus 1,000 mL (0 mL Intravenous Stopped 7/6/21 1125)   Morphine sulfate (PF) injection 4 mg (4 mg Intravenous Given 7/6/21 0933)   ondansetron (ZOFRAN) injection 4 mg (4 mg Intravenous Given 7/6/21 0933)   iopamidol (ISOVUE-370) 76 % injection 100 mL (100 mL Intravenous Given 7/6/21 1025)   cefTRIAXone (ROCEPHIN) in SWFI 1 gram/10ml IV PUSH syringe (1 g Intravenous Given 7/6/21 1106)   Morphine sulfate (PF) injection 4 mg (4 mg Intravenous Given 7/6/21 1114)   ondansetron (ZOFRAN) injection 4 mg (4 mg Intravenous Given 7/6/21 1115)                                            MDM  Number of Diagnoses or Management Options  Diverticulitis: new and requires workup  Diagnosis management comments: Medical decision making.  Patient IV established placed on a monitor with sinus rhythm given liter lactated Ringer's morphine 4 IV Zofran 4 IV and he had that repeated x1.  The patient underwent a CBC electrolytes pancreas liver enzymes urine sample all unremarkable CT and pelvis abnormal segmental narrowing wall thickening fat stranding around the proximal sigmoid colon 5 most likely representing a area of acute diverticulitis stable ventral hernia the patient was also noted to have no free air no abscess.  The patient has a prior hernia repair as well indeterminate low-density lesion area at the right lateral prostate apex.  The patient repeat exam was feeling better after couple rounds of morphine soft with some mild left lower quadrant tenderness but no rebound or guarding.  We talked about outpatient versus inpatient.  He has had diverticulitis before talked about complications including perforation and colostomy bags and  surgical issues he voices understanding but he prefers to go home.  He was made aware of the findings as well as the prostate area he has had a recent PSA which he states was normal but he will follow that up with his primary care doctor.  The patient was given Rocephin 1 g IV and discharged home inspect reviewed by pharmacy staff for me was unremarkable evidence of Norco done in February otherwise clear.  The patient was discharged home on [place] and Augmentin he will follow with his primary care doctor for further evaluation of his PSA and prostate metastatic disease he return to me if this becomes acutely worse.  Stable otherwise unremarkable improved ER course.  No evidence of perforation or acute surgical issue no evidence of appendicitis or sepsis or colitis..  Labs and urine reviewed by me CT report reviewed       Amount and/or Complexity of Data Reviewed  Clinical lab tests: reviewed  Tests in the radiology section of CPT®: reviewed    Risk of Complications, Morbidity, and/or Mortality  Presenting problems: high  Diagnostic procedures: high  Management options: high    Patient Progress  Patient progress: stable      Final diagnoses:   Diverticulitis       ED Disposition  ED Disposition     ED Disposition Condition Comment    Discharge Stable           PATIENT CONNECTION - [facility] [address]  [phone]  In 1 day      German Wells MD  [address] [facility] [place] [address]  [phone]    In 2 days           Medication List      New Prescriptions    amoxicillin-clavulanate 875-125 MG per tablet  Commonly known as: AUGMENTIN  Take 1 tablet by mouth 2 (Two) Times a Day.        Stop    ondansetron ODT 4 MG disintegrating tablet  Commonly known as: ZOFRAN-ODT           Where to Get Your Medications      These medications were sent to [facility]/pharmacy #[id number] - [place], [place] - [address] - [phone]  - [phone]  [address] [facility] [place] [address]    Hours:  24-hours Phone: [phone]   · amoxicillin-clavulanate 875-125 MG per tablet  · HYDROcodone-acetaminophen 5-325 MG per tablet          Vinh Radford MD  07/06/21 9098

## 2021-07-06 NOTE — DISCHARGE INSTRUCTIONS
Mostly liquid diet for the next few days until symptoms improve.  Augmentin sent to the pharmacy.  Return for fever vomiting sudden increased pain no improvement next couple of days or any other new or worse problems or concerns.  Please follow-up with your primary care doctor and GI doctor call tomorrow.  Norco for pain

## 2021-08-11 ENCOUNTER — APPOINTMENT (OUTPATIENT)
Dept: CT IMAGING | Facility: HOSPITAL | Age: 51
End: 2021-08-11

## 2021-08-11 ENCOUNTER — HOSPITAL ENCOUNTER (EMERGENCY)
Facility: HOSPITAL | Age: 51
Discharge: HOME | End: 2021-08-11
Admitting: EMERGENCY MEDICINE

## 2021-08-11 VITALS
OXYGEN SATURATION: 100 % | SYSTOLIC BLOOD PRESSURE: 119 MMHG | RESPIRATION RATE: 15 BRPM | WEIGHT: 291 LBS | TEMPERATURE: 98.2 F | DIASTOLIC BLOOD PRESSURE: 71 MMHG | HEART RATE: 83 BPM | BODY MASS INDEX: 36.18 KG/M2 | HEIGHT: 75 IN

## 2021-08-11 DIAGNOSIS — K57.92 DIVERTICULITIS: Primary | ICD-10-CM

## 2021-08-11 LAB
ALBUMIN SERPL-MCNC: 3.8 G/DL (ref 3.5–5.2)
ALBUMIN/GLOB SERPL: 1.5 G/DL
ALP SERPL-CCNC: 46 U/L (ref 39–117)
ALT SERPL W P-5'-P-CCNC: 18 U/L (ref 1–41)
ANION GAP SERPL CALCULATED.3IONS-SCNC: 9 MMOL/L (ref 5–15)
AST SERPL-CCNC: 21 U/L (ref 1–40)
BASOPHILS # BLD AUTO: 0.1 10*3/MM3 (ref 0–0.2)
BASOPHILS NFR BLD AUTO: 1.3 % (ref 0–1.5)
BILIRUB SERPL-MCNC: 0.3 MG/DL (ref 0–1.2)
BILIRUB UR QL STRIP: NEGATIVE
BUN SERPL-MCNC: 6 MG/DL (ref 6–20)
BUN/CREAT SERPL: 5.5 (ref 7–25)
CALCIUM SPEC-SCNC: 8.8 MG/DL (ref 8.6–10.5)
CHLORIDE SERPL-SCNC: 104 MMOL/L (ref 98–107)
CLARITY UR: CLEAR
CO2 SERPL-SCNC: 26 MMOL/L (ref 22–29)
COLOR UR: YELLOW
CREAT SERPL-MCNC: 1.1 MG/DL (ref 0.76–1.27)
DEPRECATED RDW RBC AUTO: 43.3 FL (ref 37–54)
EOSINOPHIL # BLD AUTO: 0.1 10*3/MM3 (ref 0–0.4)
EOSINOPHIL NFR BLD AUTO: 1.3 % (ref 0.3–6.2)
ERYTHROCYTE [DISTWIDTH] IN BLOOD BY AUTOMATED COUNT: 14.1 % (ref 12.3–15.4)
GFR SERPL CREATININE-BSD FRML MDRD: 85 ML/MIN/1.73
GLOBULIN UR ELPH-MCNC: 2.5 GM/DL
GLUCOSE SERPL-MCNC: 137 MG/DL (ref 65–99)
GLUCOSE UR STRIP-MCNC: NEGATIVE MG/DL
HCT VFR BLD AUTO: 43.1 % (ref 37.5–51)
HGB BLD-MCNC: 14.2 G/DL (ref 13–17.7)
HGB UR QL STRIP.AUTO: NEGATIVE
KETONES UR QL STRIP: NEGATIVE
LEUKOCYTE ESTERASE UR QL STRIP.AUTO: NEGATIVE
LIPASE SERPL-CCNC: 8 U/L (ref 13–60)
LYMPHOCYTES # BLD AUTO: 1.9 10*3/MM3 (ref 0.7–3.1)
LYMPHOCYTES NFR BLD AUTO: 33.9 % (ref 19.6–45.3)
MCH RBC QN AUTO: 29.2 PG (ref 26.6–33)
MCHC RBC AUTO-ENTMCNC: 33 G/DL (ref 31.5–35.7)
MCV RBC AUTO: 88.5 FL (ref 79–97)
MONOCYTES # BLD AUTO: 0.5 10*3/MM3 (ref 0.1–0.9)
MONOCYTES NFR BLD AUTO: 8 % (ref 5–12)
NEUTROPHILS NFR BLD AUTO: 3.2 10*3/MM3 (ref 1.7–7)
NEUTROPHILS NFR BLD AUTO: 55.5 % (ref 42.7–76)
NITRITE UR QL STRIP: NEGATIVE
NRBC BLD AUTO-RTO: 0.1 /100 WBC (ref 0–0.2)
PH UR STRIP.AUTO: 7 [PH] (ref 5–8)
PLATELET # BLD AUTO: 210 10*3/MM3 (ref 140–450)
PMV BLD AUTO: 8.1 FL (ref 6–12)
POTASSIUM SERPL-SCNC: 3.9 MMOL/L (ref 3.5–5.2)
PROT SERPL-MCNC: 6.3 G/DL (ref 6–8.5)
PROT UR QL STRIP: NEGATIVE
RBC # BLD AUTO: 4.87 10*6/MM3 (ref 4.14–5.8)
SODIUM SERPL-SCNC: 139 MMOL/L (ref 136–145)
SP GR UR STRIP: 1.02 (ref 1–1.03)
UROBILINOGEN UR QL STRIP: NORMAL
WBC # BLD AUTO: 5.7 10*3/MM3 (ref 3.4–10.8)

## 2021-08-11 PROCEDURE — 25010000002 ONDANSETRON PER 1 MG: Performed by: PHYSICIAN ASSISTANT

## 2021-08-11 PROCEDURE — 74177 CT ABD & PELVIS W/CONTRAST: CPT

## 2021-08-11 PROCEDURE — 81003 URINALYSIS AUTO W/O SCOPE: CPT | Performed by: PHYSICIAN ASSISTANT

## 2021-08-11 PROCEDURE — 25010000002 CEFTRIAXONE PER 250 MG: Performed by: PHYSICIAN ASSISTANT

## 2021-08-11 PROCEDURE — 80053 COMPREHEN METABOLIC PANEL: CPT | Performed by: PHYSICIAN ASSISTANT

## 2021-08-11 PROCEDURE — 83690 ASSAY OF LIPASE: CPT | Performed by: PHYSICIAN ASSISTANT

## 2021-08-11 PROCEDURE — 96365 THER/PROPH/DIAG IV INF INIT: CPT

## 2021-08-11 PROCEDURE — 96361 HYDRATE IV INFUSION ADD-ON: CPT

## 2021-08-11 PROCEDURE — 0 IOPAMIDOL PER 1 ML: Performed by: PHYSICIAN ASSISTANT

## 2021-08-11 PROCEDURE — 25010000002 MORPHINE PER 10 MG: Performed by: PHYSICIAN ASSISTANT

## 2021-08-11 PROCEDURE — 99283 EMERGENCY DEPT VISIT LOW MDM: CPT

## 2021-08-11 PROCEDURE — 96375 TX/PRO/DX INJ NEW DRUG ADDON: CPT

## 2021-08-11 PROCEDURE — 25010000002 KETOROLAC TROMETHAMINE PER 15 MG: Performed by: PHYSICIAN ASSISTANT

## 2021-08-11 PROCEDURE — 85025 COMPLETE CBC W/AUTO DIFF WBC: CPT | Performed by: PHYSICIAN ASSISTANT

## 2021-08-11 RX ORDER — AMOXICILLIN AND CLAVULANATE POTASSIUM 875; 125 MG/1; MG/1
1 TABLET, FILM COATED ORAL 2 TIMES DAILY
Qty: 20 TABLET | Refills: 0 | Status: SHIPPED | OUTPATIENT
Start: 2021-08-11

## 2021-08-11 RX ORDER — MORPHINE SULFATE 4 MG/ML
4 INJECTION, SOLUTION INTRAMUSCULAR; INTRAVENOUS ONCE
Status: COMPLETED | OUTPATIENT
Start: 2021-08-11 | End: 2021-08-11

## 2021-08-11 RX ORDER — ONDANSETRON 4 MG/1
4 TABLET, ORALLY DISINTEGRATING ORAL EVERY 8 HOURS PRN
Qty: 20 TABLET | Refills: 0 | Status: SHIPPED | OUTPATIENT
Start: 2021-08-11

## 2021-08-11 RX ORDER — SODIUM CHLORIDE 0.9 % (FLUSH) 0.9 %
10 SYRINGE (ML) INJECTION AS NEEDED
Status: DISCONTINUED | OUTPATIENT
Start: 2021-08-11 | End: 2021-08-11 | Stop reason: HOSPADM

## 2021-08-11 RX ORDER — KETOROLAC TROMETHAMINE 15 MG/ML
15 INJECTION, SOLUTION INTRAMUSCULAR; INTRAVENOUS ONCE
Status: COMPLETED | OUTPATIENT
Start: 2021-08-11 | End: 2021-08-11

## 2021-08-11 RX ORDER — HYDROCODONE BITARTRATE AND ACETAMINOPHEN 5; 325 MG/1; MG/1
1 TABLET ORAL EVERY 6 HOURS PRN
Qty: 12 TABLET | Refills: 0 | Status: SHIPPED | OUTPATIENT
Start: 2021-08-11

## 2021-08-11 RX ORDER — ONDANSETRON 2 MG/ML
4 INJECTION INTRAMUSCULAR; INTRAVENOUS ONCE
Status: COMPLETED | OUTPATIENT
Start: 2021-08-11 | End: 2021-08-11

## 2021-08-11 RX ADMIN — METRONIDAZOLE 500 MG: 500 INJECTION, SOLUTION INTRAVENOUS at 09:48

## 2021-08-11 RX ADMIN — ONDANSETRON 4 MG: 2 INJECTION INTRAMUSCULAR; INTRAVENOUS at 07:28

## 2021-08-11 RX ADMIN — KETOROLAC TROMETHAMINE 15 MG: 15 INJECTION, SOLUTION INTRAMUSCULAR; INTRAVENOUS at 08:42

## 2021-08-11 RX ADMIN — WATER 1 G: 100 INJECTION, SOLUTION INTRAVENOUS at 09:45

## 2021-08-11 RX ADMIN — SODIUM CHLORIDE 1000 ML: 9 INJECTION, SOLUTION INTRAVENOUS at 07:27

## 2021-08-11 RX ADMIN — IOPAMIDOL 100 ML: 755 INJECTION, SOLUTION INTRAVENOUS at 08:19

## 2021-08-11 RX ADMIN — MORPHINE SULFATE 4 MG: 4 INJECTION INTRAVENOUS at 07:28

## 2021-08-11 NOTE — DISCHARGE INSTRUCTIONS
Take antibiotics as directed.  Be sure to take full course.    Clear liquid diet for the next 3 to 5 days.  Advance your diet as tolerated.    Take Norco as needed for pain.  Do not operate heavy machinery or drink alcohol while taking this medication.    Follow-up with gastroenterology as listed below for further evaluation and management of your diverticulitis.    Follow-up with your primary care provider in 3-5 days.  If you do not have a primary care provider call [phone] for help in finding one, or you may follow up with [facility] at [phone].    Return to ED for any new or worsening symptoms

## 2021-08-11 NOTE — ED PROVIDER NOTES
"Subjective   Chief complaint: \"I have diverticulitis\"  Patient is a 50-year-old male who presents with complaints of left lower quadrant abdominal pain for the past 2 days.  Patient describes as a constant burning type pain that he rates as severe.  Patient reports so she had nausea and intermittent watery diarrhea without any hematochezia or melena.  Patient reports low-grade fever of 100.5 on Monday states he is not had a fever since.  Patient states he did get his first Covid vaccine on Sunday and that the fever was secondary to the vaccine but the pain has continued and he states he feels very similar to when he had diverticulitis in the past.  Patient denies any recent travel or known sick contacts.  He also denies any chest pain shortness of breath or urinary symptoms including dysuria or hematuria.  Patient states he took pain medication that was given to him on his prior ED visit last night with minimal relief of his pain.  Pertinent abdominal surgical history includes hernia repairs, appendectomy, and cholecystectomy.          Review of Systems   Constitutional: Negative.    HENT: Negative.    Eyes: Negative for photophobia and visual disturbance.   Respiratory: Negative.    Cardiovascular: Negative.    Gastrointestinal: Positive for abdominal pain, diarrhea and nausea. Negative for abdominal distention, blood in stool, constipation and vomiting.   Genitourinary: Negative.    Musculoskeletal: Negative for back pain, neck pain and neck stiffness.   Skin: Negative.    Neurological: Negative.        History reviewed. No pertinent past medical history.    Allergies   Allergen Reactions   • Codeine Other (See Comments)     Hypotension, tachycardia       History reviewed. No pertinent surgical history.    History reviewed. No pertinent family history.    Social History     Socioeconomic History   • Marital status:      Spouse name: Not on file   • Number of children: Not on file   • Years of education: Not " "on file   • Highest education level: Not on file           Objective   Physical Exam  Vitals and nursing note reviewed.   Constitutional:       General: He is not in acute distress.     Appearance: He is well-developed. He is not ill-appearing, toxic-appearing or diaphoretic.   HENT:      Head: Normocephalic and atraumatic.      Mouth/Throat:      Mouth: Mucous membranes are moist.      Pharynx: Oropharynx is clear.   Eyes:      Extraocular Movements: Extraocular movements intact.      Pupils: Pupils are equal, round, and reactive to light.   Cardiovascular:      Rate and Rhythm: Normal rate and regular rhythm.      Heart sounds: No murmur heard.   No friction rub. No gallop.    Pulmonary:      Effort: Pulmonary effort is normal. No tachypnea or accessory muscle usage.      Breath sounds: No decreased breath sounds, wheezing, rhonchi or rales.   Chest:      Chest wall: No mass, deformity, tenderness or crepitus.   Abdominal:      General: Bowel sounds are normal. There is no distension.      Palpations: Abdomen is soft. There is no hepatomegaly, splenomegaly or mass.      Tenderness: There is abdominal tenderness in the left lower quadrant. There is no right CVA tenderness, left CVA tenderness, guarding or rebound.      Hernia: No hernia is present.   Skin:     General: Skin is warm.      Capillary Refill: Capillary refill takes less than 2 seconds.      Findings: No rash.   Neurological:      Mental Status: He is alert and oriented to person, place, and time.   Psychiatric:         Mood and Affect: Mood normal.         Behavior: Behavior normal.         Procedures           ED Course    /71   Pulse 83   Temp 98.2 °F (36.8 °C) (Oral)   Resp 15   Ht 190.5 cm (75\")   Wt 132 kg (291 lb)   SpO2 100%   BMI 36.37 kg/m²   Medications   sodium chloride 0.9 % flush 10 mL (has no administration in time range)   metroNIDAZOLE (FLAGYL) 500 mg/100mL IVPB (500 mg Intravenous New Bag 8/11/21 0948)   sodium chloride " 0.9 % bolus 1,000 mL (0 mL Intravenous Stopped 8/11/21 0944)   ondansetron (ZOFRAN) injection 4 mg (4 mg Intravenous Given 8/11/21 0728)   Morphine sulfate (PF) injection 4 mg (4 mg Intravenous Given 8/11/21 0728)   iopamidol (ISOVUE-370) 76 % injection 100 mL (100 mL Intravenous Given 8/11/21 0819)   ketorolac (TORADOL) injection 15 mg (15 mg Intravenous Given 8/11/21 0842)   cefTRIAXone (ROCEPHIN) in SWFI 1 gram/10ml IV PUSH syringe (1 g Intravenous Given 8/11/21 0945)     Labs Reviewed   COMPREHENSIVE METABOLIC PANEL - Abnormal; Notable for the following components:       Result Value    Glucose 137 (*)     BUN/Creatinine Ratio 5.5 (*)     All other components within normal limits    Narrative:     GFR Normal >60  Chronic Kidney Disease <60  Kidney Failure <15     LIPASE - Abnormal; Notable for the following components:    Lipase 8 (*)     All other components within normal limits   URINALYSIS W/ CULTURE IF INDICATED - Normal    Narrative:     Urine microscopic not indicated.   CBC WITH AUTO DIFFERENTIAL - Normal   CBC AND DIFFERENTIAL    Narrative:     The following orders were created for panel order CBC & Differential.  Procedure                               Abnormality         Status                     ---------                               -----------         ------                     CBC Auto Differential[[id number]]        Normal              Final result                 Please view results for these tests on the individual orders.     CT Abdomen Pelvis With Contrast    Result Date: 8/11/2021   1. FINDINGS consistent with acute sigmoid diverticulitis, appearing very similar in location and severity when compared to the 7/6/2021 examination. There is no evidence of obstruction obstruction. No evidence of gross perforation or diverticular abscess. 2. 13 x 17 mm low-density focus at the right lateral prostatic apex, unchanged from prior examination. It is indeterminate. Correlate with PSA levels. 3.  Fat-containing ventral hernia to left of midline, at the superior-lateral margin of previous mesh repair. This is unchanged. 4. Stable hepatic enlargement with steatosis. 5. Additional chronic findings as described above.   Electronically Signed By-Chayito Sanchez MD On:8/11/2021 8:28 AM This report was finalized on [id number] by  Chayito Sanchez MD.                                           MDM  Number of Diagnoses or Management Options  Diverticulitis  Diagnosis management comments: Chart Review:  -ED visit 7/6/2021 for diverticulitis discharged home with Norco and Augmentin   -ED visit 12/28/2020 for diverticulitis discharged home with Augmentin ,anti-nausea, and pain medication    Comorbidity: As per past medical history  Differentials: Hydronephrosis secondary to stone, intra-abdominal infection, dissection, peritonitis, peptic ulcer disease, pancreatitis, hepatitis, ischemic bowel, bowel obstruction, appendicitis      ;this list is not all inclusive and does not constitute the entirety of considered causes  Labs: CBC unremarkable WBC 5.7 lipase 8 CMP shows glucose 137 BUN 6 creatinine 1.1 sodium 139 potassium 3.9 liver enzymes within normal limits total bilirubin 0.3 urinalysis unremarkable for UTI  Imaging: Was interpreted by physician and reviewed by myself:  CT Abdomen Pelvis With Contrast  Result Date: 8/11/2021   1. FINDINGS consistent with acute sigmoid diverticulitis, appearing very similar in location and severity when compared to the 7/6/2021 examination. There is no evidence of obstruction obstruction. No evidence of gross perforation or diverticular abscess. 2. 13 x 17 mm low-density focus at the right lateral prostatic apex, unchanged from prior examination. It is indeterminate. Correlate with PSA levels. 3. Fat-containing ventral hernia to left of midline, at the superior-lateral margin of previous mesh repair. This is unchanged. 4. Stable hepatic enlargement with steatosis. 5. Additional  chronic findings as described above.   Electronically Signed By-Chayito Sanchez MD On:8/11/2021 8:28 AM This report was finalized on [id number] by  Chayito Sanchez MD.    Disposition/Treatment:  Appropriate PPE was worn during exam and throughout all encounters with the patient.  When the ED IV was placed and labs were obtained.  Patient was afebrile and appeared nontoxic he was given morphine Zofran and fluids.  Patient has tolerated morphine in the past. Patient was given additional Toradol for his pain. Upon reassessment patient is resting quietly does report improvement of his pain lab results were fairly unremarkable no signs of severe infection or electrolyte abnormality as above. Urinalysis showed no signs of UTI. CT of abdomen and pelvis showed uncomplicated diverticulitis as above. Additional chronic findings as well. Lab results and findings were discussed with the patient at bedside he voiced understanding and discharge along with signs and symptoms to return to the ED. Patient states he was supposed to follow-up with gastroenterology after his last ED visit last month but states he had some family issues and was not able to. I stressed the importance of following up with GI for further evaluation management of his recurrent diverticulitis. Inspect was queried patient be given Norco and Augmentin for home. Patient was stable at time of discharge and in agreement with plan.       Amount and/or Complexity of Data Reviewed  Clinical lab tests: reviewed  Tests in the radiology section of CPT®: reviewed        Final diagnoses:   Diverticulitis       ED Disposition  ED Disposition     ED Disposition Condition Comment    Discharge Stable           [facility] EMERGENCY DEPARTMENT  [address] [facility] [address] [facility] [address] [phone]  Go to   If symptoms worsen    [facility]  [address] [facility] [address] [phone]  Schedule an appointment as  soon as possible for a visit       [facility]  [facility] [address]  [phone]  Call   If you do not have a primary care provider         Medication List      New Prescriptions    ondansetron ODT 4 MG disintegrating tablet  Commonly known as: ZOFRAN-ODT  Place 1 tablet on the tongue Every 8 (Eight) Hours As Needed for Nausea.        Changed    HYDROcodone-acetaminophen 5-325 MG per tablet  Commonly known as: NORCO  Take 1 tablet by mouth Every 6 (Six) Hours As Needed for Moderate Pain .  What changed: reasons to take this           Where to Get Your Medications      These medications were sent to [facility]/pharmacy #[id number] - [place], [place] - [address] [place] [address] - [phone]  - [phone] [address] [place] [address]    Hours: 24-hours Phone: [phone]   · amoxicillin-clavulanate 875-125 MG per tablet  · HYDROcodone-acetaminophen 5-325 MG per tablet  · ondansetron ODT 4 MG disintegrating tablet          Ray Kelley PA  08/11/21 1002

## 2021-09-24 ENCOUNTER — OFFICE (AMBULATORY)
Dept: URBAN - METROPOLITAN AREA CLINIC 64 | Facility: CLINIC | Age: 51
End: 2021-09-24

## 2021-09-24 VITALS
WEIGHT: 285 LBS | DIASTOLIC BLOOD PRESSURE: 86 MMHG | SYSTOLIC BLOOD PRESSURE: 126 MMHG | HEIGHT: 75 IN | HEART RATE: 91 BPM

## 2021-09-24 DIAGNOSIS — K57.92 DIVERTICULITIS OF INTESTINE, PART UNSPECIFIED, WITHOUT PERFO: ICD-10-CM

## 2021-09-24 DIAGNOSIS — K59.00 CONSTIPATION, UNSPECIFIED: ICD-10-CM

## 2021-09-24 PROCEDURE — 99214 OFFICE O/P EST MOD 30 MIN: CPT | Performed by: NURSE PRACTITIONER

## 2021-11-05 ENCOUNTER — ON CAMPUS - OUTPATIENT (AMBULATORY)
Dept: URBAN - METROPOLITAN AREA HOSPITAL 2 | Facility: HOSPITAL | Age: 51
End: 2021-11-05
Payer: COMMERCIAL

## 2021-11-05 VITALS
HEART RATE: 105 BPM | SYSTOLIC BLOOD PRESSURE: 106 MMHG | DIASTOLIC BLOOD PRESSURE: 88 MMHG | DIASTOLIC BLOOD PRESSURE: 80 MMHG | DIASTOLIC BLOOD PRESSURE: 65 MMHG | OXYGEN SATURATION: 100 % | SYSTOLIC BLOOD PRESSURE: 93 MMHG | SYSTOLIC BLOOD PRESSURE: 98 MMHG | RESPIRATION RATE: 16 BRPM | DIASTOLIC BLOOD PRESSURE: 71 MMHG | HEART RATE: 96 BPM | DIASTOLIC BLOOD PRESSURE: 101 MMHG | DIASTOLIC BLOOD PRESSURE: 58 MMHG | HEART RATE: 98 BPM | RESPIRATION RATE: 18 BRPM | HEART RATE: 103 BPM | TEMPERATURE: 97.5 F | SYSTOLIC BLOOD PRESSURE: 119 MMHG | WEIGHT: 276 LBS | SYSTOLIC BLOOD PRESSURE: 142 MMHG | OXYGEN SATURATION: 98 % | OXYGEN SATURATION: 99 % | SYSTOLIC BLOOD PRESSURE: 126 MMHG | HEART RATE: 115 BPM | RESPIRATION RATE: 17 BRPM | HEIGHT: 75 IN | DIASTOLIC BLOOD PRESSURE: 87 MMHG | SYSTOLIC BLOOD PRESSURE: 181 MMHG

## 2021-11-05 DIAGNOSIS — K57.30 DIVERTICULOSIS OF LARGE INTESTINE WITHOUT PERFORATION OR ABS: ICD-10-CM

## 2021-11-05 PROCEDURE — 45378 DIAGNOSTIC COLONOSCOPY: CPT | Performed by: INTERNAL MEDICINE

## 2021-11-05 RX ORDER — ALUMINUM ZIRCONIUM OCTACHLOROHYDREX GLY 16 G/100G
4 GEL TOPICAL
Qty: 30 | Refills: 5 | Status: ACTIVE
Start: 2021-11-05

## 2021-11-05 NOTE — SERVICENOTES
consider referral to surgeon for evaluation of L hemicolectomy if diverticulitis recurs, he has had 10 episodes reportedly

## 2021-11-05 NOTE — SERVICEHPINOTES
JIMMIE HAMMER  is a  50  male   who presents today for a  Colonoscopy   for   the indications listed below. The updated Patient Profile was reviewed prior to the procedure, in conjunction with the Physical Exam, including medical conditions, surgical procedures, medications, allergies, family history and social history. See Physical Exam time stamp below for date and time of HPI completion.Pre-operatively, I reviewed the indication(s) for the procedure, the risks of the procedure [including but not limited to: unexpected bleeding possibly requiring hospitalization and/or unplanned repeat procedures, perforation possibly requiring surgical treatment, missed lesions and complications of sedation/MAC (also explained by anesthesia staff)]. I have evaluated the patient for risks associated with the planned anesthesia and the procedure to be performed and find the patient an acceptable candidate for IV sedation.Multiple opportunities were provided for any questions or concerns, and all questions were answered satisfactorily before any anesthesia was administered. We will proceed with the planned procedure.br

## 2021-11-19 ENCOUNTER — OFFICE (AMBULATORY)
Dept: URBAN - METROPOLITAN AREA CLINIC 64 | Facility: CLINIC | Age: 51
End: 2021-11-19

## 2021-11-19 VITALS
HEIGHT: 75 IN | WEIGHT: 279 LBS | DIASTOLIC BLOOD PRESSURE: 90 MMHG | SYSTOLIC BLOOD PRESSURE: 132 MMHG | HEART RATE: 90 BPM

## 2021-11-19 DIAGNOSIS — K57.92 DIVERTICULITIS OF INTESTINE, PART UNSPECIFIED, WITHOUT PERFO: ICD-10-CM

## 2021-11-19 PROCEDURE — 99213 OFFICE O/P EST LOW 20 MIN: CPT | Performed by: INTERNAL MEDICINE

## 2021-11-19 RX ORDER — ALUMINUM ZIRCONIUM OCTACHLOROHYDREX GLY 16 G/100G
4 GEL TOPICAL
Qty: 30 | Refills: 5 | Status: COMPLETED
Start: 2021-11-19 | End: 2022-03-21

## 2021-11-19 RX ORDER — PANTOPRAZOLE 20 MG/1
20 TABLET, DELAYED RELEASE ORAL
Qty: 180 | Refills: 3 | Status: COMPLETED
Start: 2021-11-19 | End: 2022-12-22

## 2022-03-09 ENCOUNTER — OFFICE (AMBULATORY)
Dept: URBAN - METROPOLITAN AREA CLINIC 64 | Facility: CLINIC | Age: 52
End: 2022-03-09

## 2022-03-09 VITALS
WEIGHT: 282 LBS | HEART RATE: 86 BPM | HEIGHT: 75 IN | DIASTOLIC BLOOD PRESSURE: 90 MMHG | SYSTOLIC BLOOD PRESSURE: 137 MMHG

## 2022-03-09 DIAGNOSIS — K21.9 GASTRO-ESOPHAGEAL REFLUX DISEASE WITHOUT ESOPHAGITIS: ICD-10-CM

## 2022-03-09 PROCEDURE — 99214 OFFICE O/P EST MOD 30 MIN: CPT | Performed by: INTERNAL MEDICINE

## 2022-03-09 RX ORDER — PANTOPRAZOLE 20 MG/1
20 TABLET, DELAYED RELEASE ORAL
Qty: 180 | Refills: 3 | Status: COMPLETED
Start: 2021-11-19 | End: 2022-12-22

## 2022-03-21 ENCOUNTER — ON CAMPUS - OUTPATIENT (AMBULATORY)
Dept: URBAN - METROPOLITAN AREA HOSPITAL 2 | Facility: HOSPITAL | Age: 52
End: 2022-03-21
Payer: COMMERCIAL

## 2022-03-21 VITALS
DIASTOLIC BLOOD PRESSURE: 98 MMHG | HEART RATE: 99 BPM | DIASTOLIC BLOOD PRESSURE: 90 MMHG | DIASTOLIC BLOOD PRESSURE: 72 MMHG | DIASTOLIC BLOOD PRESSURE: 88 MMHG | RESPIRATION RATE: 16 BRPM | SYSTOLIC BLOOD PRESSURE: 152 MMHG | RESPIRATION RATE: 17 BRPM | WEIGHT: 282 LBS | OXYGEN SATURATION: 99 % | HEART RATE: 92 BPM | RESPIRATION RATE: 22 BRPM | SYSTOLIC BLOOD PRESSURE: 124 MMHG | DIASTOLIC BLOOD PRESSURE: 89 MMHG | SYSTOLIC BLOOD PRESSURE: 142 MMHG | HEART RATE: 84 BPM | HEART RATE: 85 BPM | DIASTOLIC BLOOD PRESSURE: 77 MMHG | SYSTOLIC BLOOD PRESSURE: 167 MMHG | SYSTOLIC BLOOD PRESSURE: 132 MMHG | SYSTOLIC BLOOD PRESSURE: 145 MMHG | OXYGEN SATURATION: 100 % | HEART RATE: 96 BPM | HEIGHT: 75 IN | RESPIRATION RATE: 18 BRPM | TEMPERATURE: 97.7 F

## 2022-03-21 DIAGNOSIS — K21.9 GASTRO-ESOPHAGEAL REFLUX DISEASE WITHOUT ESOPHAGITIS: ICD-10-CM

## 2022-03-21 DIAGNOSIS — K22.5 DIVERTICULUM OF ESOPHAGUS, ACQUIRED: ICD-10-CM

## 2022-03-21 DIAGNOSIS — R13.10 DYSPHAGIA, UNSPECIFIED: ICD-10-CM

## 2022-03-21 PROCEDURE — 43248 EGD GUIDE WIRE INSERTION: CPT | Performed by: INTERNAL MEDICINE

## 2022-03-21 NOTE — SERVICEHPINOTES
JIMMIE HAMMER  is a  51  male   who presents today for a  EGD   for   the indications listed below. The updated Patient Profile was reviewed prior to the procedure, in conjunction with the Physical Exam, including medical conditions, surgical procedures, medications, allergies, family history and social history. See Physical Exam time stamp below for date and time of HPI completion.Pre-operatively, I reviewed the indication(s) for the procedure, the risks of the procedure [including but not limited to: unexpected bleeding possibly requiring hospitalization and/or unplanned repeat procedures, perforation possibly requiring surgical treatment, missed lesions and complications of sedation/MAC (also explained by anesthesia staff)]. I have evaluated the patient for risks associated with the planned anesthesia and the procedure to be performed and find the patient an acceptable candidate for IV sedation.Multiple opportunities were provided for any questions or concerns, and all questions were answered satisfactorily before any anesthesia was administered. We will proceed with the planned procedure.br

## 2022-12-22 ENCOUNTER — OFFICE (AMBULATORY)
Dept: URBAN - METROPOLITAN AREA CLINIC 64 | Facility: CLINIC | Age: 52
End: 2022-12-22

## 2022-12-22 VITALS
WEIGHT: 290 LBS | DIASTOLIC BLOOD PRESSURE: 97 MMHG | HEART RATE: 88 BPM | SYSTOLIC BLOOD PRESSURE: 139 MMHG | HEIGHT: 75 IN

## 2022-12-22 DIAGNOSIS — R13.10 DYSPHAGIA, UNSPECIFIED: ICD-10-CM

## 2022-12-22 DIAGNOSIS — K22.5 DIVERTICULUM OF ESOPHAGUS, ACQUIRED: ICD-10-CM

## 2022-12-22 DIAGNOSIS — K21.9 GASTRO-ESOPHAGEAL REFLUX DISEASE WITHOUT ESOPHAGITIS: ICD-10-CM

## 2022-12-22 PROCEDURE — 99214 OFFICE O/P EST MOD 30 MIN: CPT | Performed by: NURSE PRACTITIONER

## 2022-12-22 RX ORDER — DEXLANSOPRAZOLE 60 MG/1
60 CAPSULE, DELAYED RELEASE ORAL
Qty: 90 | Refills: 3 | Status: ACTIVE
Start: 2022-12-22

## 2022-12-22 RX ORDER — PANTOPRAZOLE 20 MG/1
20 TABLET, DELAYED RELEASE ORAL
Qty: 180 | Refills: 3 | Status: COMPLETED
Start: 2021-11-19 | End: 2022-12-22

## 2022-12-22 RX ORDER — FAMOTIDINE 20 MG/1
40 TABLET, FILM COATED ORAL
Qty: 0 | Refills: 0 | Status: COMPLETED
End: 2022-12-22

## 2023-06-12 ENCOUNTER — OFFICE (AMBULATORY)
Dept: URBAN - METROPOLITAN AREA CLINIC 64 | Facility: CLINIC | Age: 53
End: 2023-06-12

## 2023-06-12 VITALS
SYSTOLIC BLOOD PRESSURE: 133 MMHG | WEIGHT: 285 LBS | DIASTOLIC BLOOD PRESSURE: 82 MMHG | HEIGHT: 75 IN | HEART RATE: 82 BPM

## 2023-06-12 DIAGNOSIS — K22.5 DIVERTICULUM OF ESOPHAGUS, ACQUIRED: ICD-10-CM

## 2023-06-12 DIAGNOSIS — K21.9 GASTRO-ESOPHAGEAL REFLUX DISEASE WITHOUT ESOPHAGITIS: ICD-10-CM

## 2023-06-12 DIAGNOSIS — R13.10 DYSPHAGIA, UNSPECIFIED: ICD-10-CM

## 2023-06-12 PROCEDURE — 99213 OFFICE O/P EST LOW 20 MIN: CPT | Performed by: INTERNAL MEDICINE

## 2023-06-12 NOTE — SERVICEHPINOTES
JIMMIE HAMMER is a 52 year old male with a history of anemia, recurrent diverticulitis, , Zenker's diverticulum and cholecystectomy who is being seen in the office today for follow-up on GERD and dysphagia. He was referred to ENT and they are planning on sending him to  ENT to evaluate for repair. He notes dysphagia, regurgitation and halitosis. . Patient is taking pantoprazole 20 mg once a day as well as famotidine 20 mg once a day. No wt loss or blood in stool. 
br 8/11/2021 WBCs 5.7, H2 B 14.2, .  LFTs normal. Creatinine 1.1.br8/11/2021 CT A/P with: Acute sigmoid diverticulitis.  Same appearance as 7/6/2021.  Stable hepatic enlargement with steatosis.3/21/2022 EGD (Dr. Garay) large single diverticulum in cricopharyngeal area.  Wire guided dilation to 54 [place].  Refer to ENT of persistent dysphagia.brNov 2021 colonoscopy mod pan diverticulosis.  RECALL [id number] colonoscopy (Dr. Garay)–moderate whole colon diverticulosis.  Mucosal descending colon polyp.

## 2023-07-17 ENCOUNTER — ON CAMPUS - OUTPATIENT (AMBULATORY)
Dept: URBAN - METROPOLITAN AREA HOSPITAL 85 | Facility: HOSPITAL | Age: 53
End: 2023-07-17
Payer: COMMERCIAL

## 2023-07-17 ENCOUNTER — HOSPITAL ENCOUNTER (EMERGENCY)
Facility: HOSPITAL | Age: 53
Discharge: HOME | End: 2023-07-17
Attending: EMERGENCY MEDICINE | Admitting: EMERGENCY MEDICINE
Payer: COMMERCIAL

## 2023-07-17 ENCOUNTER — APPOINTMENT (OUTPATIENT)
Dept: CT IMAGING | Facility: HOSPITAL | Age: 53
End: 2023-07-17
Payer: COMMERCIAL

## 2023-07-17 VITALS
BODY MASS INDEX: 34.82 KG/M2 | TEMPERATURE: 96.7 F | WEIGHT: 280 LBS | DIASTOLIC BLOOD PRESSURE: 89 MMHG | SYSTOLIC BLOOD PRESSURE: 156 MMHG | RESPIRATION RATE: 18 BRPM | OXYGEN SATURATION: 98 % | HEIGHT: 75 IN | HEART RATE: 98 BPM

## 2023-07-17 DIAGNOSIS — R30.0 DYSURIA: Primary | ICD-10-CM

## 2023-07-17 DIAGNOSIS — R13.10 DYSPHAGIA, UNSPECIFIED: ICD-10-CM

## 2023-07-17 DIAGNOSIS — R13.10 DYSPHAGIA, UNSPECIFIED TYPE: ICD-10-CM

## 2023-07-17 DIAGNOSIS — K22.5 DIVERTICULUM OF ESOPHAGUS, ACQUIRED: ICD-10-CM

## 2023-07-17 DIAGNOSIS — K22.2 ESOPHAGEAL OBSTRUCTION DUE TO FOOD IMPACTION: ICD-10-CM

## 2023-07-17 DIAGNOSIS — T18.128A FOOD IN ESOPHAGUS CAUSING OTHER INJURY, INITIAL ENCOUNTER: ICD-10-CM

## 2023-07-17 DIAGNOSIS — T18.128A ESOPHAGEAL OBSTRUCTION DUE TO FOOD IMPACTION: ICD-10-CM

## 2023-07-17 LAB
ANION GAP SERPL CALCULATED.3IONS-SCNC: 8.2 MMOL/L (ref 5–15)
BASOPHILS # BLD AUTO: 0.01 10*3/MM3 (ref 0–0.2)
BASOPHILS NFR BLD AUTO: 0.2 % (ref 0–1.5)
BILIRUB UR QL STRIP: NEGATIVE
BUN SERPL-MCNC: 10 MG/DL (ref 6–20)
BUN/CREAT SERPL: 9.6 (ref 7–25)
CALCIUM SPEC-SCNC: 9.2 MG/DL (ref 8.6–10.5)
CHLORIDE SERPL-SCNC: 105 MMOL/L (ref 98–107)
CLARITY UR: CLEAR
CO2 SERPL-SCNC: 26.8 MMOL/L (ref 22–29)
COLOR UR: YELLOW
CREAT SERPL-MCNC: 1.04 MG/DL (ref 0.76–1.27)
DEPRECATED RDW RBC AUTO: 45.6 FL (ref 37–54)
EGFRCR SERPLBLD CKD-EPI 2021: 85.3 ML/MIN/1.73
EOSINOPHIL # BLD AUTO: 0.11 10*3/MM3 (ref 0–0.4)
EOSINOPHIL NFR BLD AUTO: 1.9 % (ref 0.3–6.2)
ERYTHROCYTE [DISTWIDTH] IN BLOOD BY AUTOMATED COUNT: 13.7 % (ref 12.3–15.4)
GLUCOSE BLDC GLUCOMTR-MCNC: 82 MG/DL (ref 70–105)
GLUCOSE SERPL-MCNC: 108 MG/DL (ref 65–99)
GLUCOSE UR STRIP-MCNC: NEGATIVE MG/DL
HCT VFR BLD AUTO: 43.8 % (ref 37.5–51)
HGB BLD-MCNC: 13.9 G/DL (ref 13–17.7)
HGB UR QL STRIP.AUTO: NEGATIVE
IMM GRANULOCYTES # BLD AUTO: 0 10*3/MM3 (ref 0–0.05)
IMM GRANULOCYTES NFR BLD AUTO: 0 % (ref 0–0.5)
KETONES UR QL STRIP: NEGATIVE
LEUKOCYTE ESTERASE UR QL STRIP.AUTO: NEGATIVE
LYMPHOCYTES # BLD AUTO: 2.24 10*3/MM3 (ref 0.7–3.1)
LYMPHOCYTES NFR BLD AUTO: 38.8 % (ref 19.6–45.3)
MCH RBC QN AUTO: 28.4 PG (ref 26.6–33)
MCHC RBC AUTO-ENTMCNC: 31.7 G/DL (ref 31.5–35.7)
MCV RBC AUTO: 89.4 FL (ref 79–97)
MONOCYTES # BLD AUTO: 0.4 10*3/MM3 (ref 0.1–0.9)
MONOCYTES NFR BLD AUTO: 6.9 % (ref 5–12)
NEUTROPHILS NFR BLD AUTO: 3.01 10*3/MM3 (ref 1.7–7)
NEUTROPHILS NFR BLD AUTO: 52.2 % (ref 42.7–76)
NITRITE UR QL STRIP: NEGATIVE
PH UR STRIP.AUTO: 6 [PH] (ref 5–8)
PLATELET # BLD AUTO: 229 10*3/MM3 (ref 140–450)
PMV BLD AUTO: 9.8 FL (ref 6–12)
POTASSIUM SERPL-SCNC: 4 MMOL/L (ref 3.5–5.2)
PROT UR QL STRIP: NEGATIVE
RBC # BLD AUTO: 4.9 10*6/MM3 (ref 4.14–5.8)
SODIUM SERPL-SCNC: 140 MMOL/L (ref 136–145)
SP GR UR STRIP: 1.02 (ref 1–1.03)
UROBILINOGEN UR QL STRIP: NORMAL
WBC NRBC COR # BLD: 5.77 10*3/MM3 (ref 3.4–10.8)

## 2023-07-17 PROCEDURE — 43450 DILATE ESOPHAGUS 1/MULT PASS: CPT | Performed by: INTERNAL MEDICINE

## 2023-07-17 PROCEDURE — 82948 REAGENT STRIP/BLOOD GLUCOSE: CPT

## 2023-07-17 PROCEDURE — 80048 BASIC METABOLIC PNL TOTAL CA: CPT | Performed by: EMERGENCY MEDICINE

## 2023-07-17 PROCEDURE — 70491 CT SOFT TISSUE NECK W/DYE: CPT

## 2023-07-17 PROCEDURE — 43247 EGD REMOVE FOREIGN BODY: CPT | Performed by: INTERNAL MEDICINE

## 2023-07-17 PROCEDURE — 25510000001 IOPAMIDOL PER 1 ML: Performed by: EMERGENCY MEDICINE

## 2023-07-17 PROCEDURE — 85025 COMPLETE CBC W/AUTO DIFF WBC: CPT | Performed by: EMERGENCY MEDICINE

## 2023-07-17 PROCEDURE — 99285 EMERGENCY DEPT VISIT HI MDM: CPT

## 2023-07-17 PROCEDURE — 81003 URINALYSIS AUTO W/O SCOPE: CPT | Performed by: EMERGENCY MEDICINE

## 2023-07-17 RX ORDER — SODIUM CHLORIDE 9 MG/ML
50 INJECTION, SOLUTION INTRAVENOUS CONTINUOUS
Status: DISCONTINUED | OUTPATIENT
Start: 2023-07-17 | End: 2023-07-17 | Stop reason: HOSPADM

## 2023-07-17 RX ADMIN — IOPAMIDOL 100 ML: 755 INJECTION, SOLUTION INTRAVENOUS at 05:18

## 2023-07-17 RX ADMIN — SODIUM CHLORIDE 50 ML/HR: 9 INJECTION, SOLUTION INTRAVENOUS at 10:11

## 2023-07-17 NOTE — ED NOTES
Pt was able to drink cup of water without difficulty. Pt able to eat a cup of jello without difficulty. Pt states it seems to be going down okay.

## 2023-07-17 NOTE — DISCHARGE INSTRUCTIONS
A responsible adult should stay with you and you should rest quietly for the rest of the day.    Do not drink alcohol, drive, operate any heavy machinery or power tools or make any legal/important decisions for the next 24 hours.     Progress your diet as tolerated.  If you begin to experience severe pain, increased shortness of breath, racing heartbeat or a fever above 101 F, seek immediate medical attention.     Follow up with MD as instructed. Call office for results in 3 to 5 days if needed. [phone]    Impression:  Zenker's diverticulum impacted with food status post clearance and removal as described  Dysphagia status post esophageal dilation to 48 [place]  Normal stomach and duodenum     Recommendations:  We will recommend alternating a bite of food with a drink of water to help clear the Zenker's at mealtime  Recommend proceeding with ENT evaluation for endoscopic diverticuli to me as soon as possible  Call for any postop problems  We appreciate the referral thank you        German Wells MD      Date: 7/17/2023  Time: 12:06 EDT

## 2023-07-17 NOTE — H&P
" LOS: 0 days   Patient Care Team:  Madi Mejias APRN as PCP - General (Nurse Practitioner)      Subjective     Interval History:     Subjective: Meat impaction of the esophagus with history of Zenker's diverticulum and abnormal CT of the neck showing food within the diverticulum  Dysphagia      ROS:   No chest pain, shortness of breath, or cough.  No vomiting or hematemesis         Medication Review:     Current Facility-Administered Medications:     sodium chloride 0.9 % infusion, 50 mL/hr, Intravenous, Continuous, German Wells MD, Last Rate: 50 mL/hr at 07/17/23 1126, Currently Infusing at 07/17/23 1126    Facility-Administered Medications Ordered in Other Encounters:     dexamethasone (DECADRON) injection, , Intravenous, PRN, Johnson, Chayito, CRNA, 4 mg at 07/17/23 1143    esmolol (BREVIBLOC) injection, , Intravenous, PRN, Johnson, Chayito, CRNA, 30 mg at 07/17/23 1131    lidocaine (XYLOCAINE) 2% injection, , Infiltration, PRN, Johnson, Chayito, CRNA, 50 mg at 07/17/23 1131    ondansetron (ZOFRAN) injection, , Intravenous, PRN, Johnson, Chayito, CRNA, 4 mg at 07/17/23 1143    Propofol (DIPRIVAN) injection, , Intravenous, PRN, Johnson, Chayito, CRNA, 50 mg at 07/17/23 1158    succinylcholine (ANECTINE) injection, , Intravenous, PRN, Johnson, Chayito, CRNA, 100 mg at 07/17/23 1131      Objective     Vital Signs  Vitals:    07/17/23 0649 07/17/23 0729 07/17/23 0731 07/17/23 1001   BP: 140/94  136/88 156/86   BP Location:   Right arm Left arm   Patient Position:   Sitting Sitting   Pulse: 80 82 82 78   Resp:  16 16 17   Temp:  98 °F (36.7 °C) 98 °F (36.7 °C) 98.1 °F (36.7 °C)   TempSrc:  Temporal Temporal Oral   SpO2: 98% 99% 99% 99%   Weight:    127 kg (280 lb)   Height:    190.5 cm (75\")       Physical Exam:    General Appearance:    Awake and alert, in no acute distress   Head:    Normocephalic, without obvious abnormality   Eyes:          Conjunctivae normal, anicteric sclera   Throat:   No oral lesions, no thrush, oral mucosa moist "   Neck:   No adenopathy, supple, no JVD   CV/Lungs:     RRR, respirations regular, even and unlabored   Abdomen:     Soft, non-tender, no rebound or guarding, non-distended, no hepatosplenomegaly   Rectal:     Deferred   Extremities:   No edema, no cyanosis   Skin:   No bruising or rash, no jaundice        Results Review:    Lab Results (last 24 hours)       Procedure Component Value Units Date/Time    POC Glucose Once [[id number]]  (Normal) Collected: 07/17/23 0942    Specimen: Blood Updated: 07/17/23 0944     Glucose 82 mg/dL      Comment: Serial Number: [id number]:  [id number]       Basic Metabolic Panel [[id number]]  (Abnormal) Collected: 07/17/23 0443    Specimen: Blood Updated: 07/17/23 0510     Glucose 108 mg/dL      BUN 10 mg/dL      Creatinine 1.04 mg/dL      Sodium 140 mmol/L      Potassium 4.0 mmol/L      Comment: Slight hemolysis detected by analyzer. Results may be affected.        Chloride 105 mmol/L      CO2 26.8 mmol/L      Calcium 9.2 mg/dL      BUN/Creatinine Ratio 9.6     Anion Gap 8.2 mmol/L      eGFR 85.3 mL/min/1.73     Narrative:      GFR Normal >60  Chronic Kidney Disease <60  Kidney Failure <15      CBC & Differential [[id number]]  (Normal) Collected: 07/17/23 0443    Specimen: Blood Updated: 07/17/23 0452    Narrative:      The following orders were created for panel order CBC & Differential.  Procedure                               Abnormality         Status                     ---------                               -----------         ------                     CBC Auto Differential[[id number]]        Normal              Final result                 Please view results for these tests on the individual orders.    CBC Auto Differential [[id number]]  (Normal) Collected: 07/17/23 0443    Specimen: Blood Updated: 07/17/23 0452     WBC 5.77 10*3/mm3      RBC 4.90 10*6/mm3      Hemoglobin 13.9 g/dL      Hematocrit 43.8 %      MCV 89.4 fL      MCH 28.4 pg      MCHC 31.7 g/dL      RDW 13.7 %       RDW-SD 45.6 fl      MPV 9.8 fL      Platelets 229 10*3/mm3      Neutrophil % 52.2 %      Lymphocyte % 38.8 %      Monocyte % 6.9 %      Eosinophil % 1.9 %      Basophil % 0.2 %      Immature Grans % 0.0 %      Neutrophils, Absolute 3.01 10*3/mm3      Lymphocytes, Absolute 2.24 10*3/mm3      Monocytes, Absolute 0.40 10*3/mm3      Eosinophils, Absolute 0.11 10*3/mm3      Basophils, Absolute 0.01 10*3/mm3      Immature Grans, Absolute 0.00 10*3/mm3     Urinalysis without microscopic (no culture) - Urine, Clean Catch [[id number]]  (Normal) Collected: 07/17/23 0415    Specimen: Urine, Clean Catch Updated: 07/17/23 0421     Color, UA Yellow     Appearance, UA Clear     pH, UA 6.0     Specific Gravity, UA 1.025     Glucose, UA Negative     Ketones, UA Negative     Bilirubin, UA Negative     Blood, UA Negative     Protein, UA Negative     Leuk Esterase, UA Negative     Nitrite, UA Negative     Urobilinogen, UA 0.2 E.U./dL            Imaging Results (Last 24 Hours)       Procedure Component Value Units Date/Time    CT Soft Tissue Neck With Contrast [[id number]] Collected: 07/17/23 0421     Updated: 07/17/23 0649    Narrative:      EXAMINATION: CT SOFT TISSUE NECK W CONTRAST    DATE: 7/17/2023 5:17 AM     INDICATION: throat pain, problems swallowing. History of Zenker diverticulum.     COMPARISON: None available.    TECHNIQUE: Following the uneventful intravenous administration of 100 mL of Omnipaque 350  contrast material, axial postcontrast images were obtained through the neck.  Coronal and sagittal reformatted images were created.  There were no immediate   complications.  CT dose lowering techniques were used, to include: automated exposure control, adjustment for patient size, and or use of iterative reconstruction    FINDINGS:    Bubbly mass measuring approximately 3.2 x 1.1 x 2.2 cm (TV by AP by CC), presumably a food bolus seated to the left dorsolateral aspect of the proximal esophagus.      No cervical  lymphadenopathy.  No mass of the major salivary glands.  No dominant thyroid nodule.      Orbital structures are unremarkable.  Because retention cyst left maxillary sinus. Imaged mastoid air cells are essentially clear.   Partially visualized aberrant right subclavian artery with a retroesophageal course which can be a source of dysphagia.  Imaged portions of the brain and spinal cord are unremarkable.      Visualized lung apices are clear.   No aggressive osseous lesions.           Impression:        1.  Food bolus measuring approximately 3 cm seated to the left dorsolateral aspect of the proximal esophagus. Given the patient's reported history of a Zenker diverticulum, this most likely represents a fluid bolus within the Zenker's diverticulum.  2.  Partially visualized aberrant right subclavian artery with a retroesophageal course which can be a source of dysphagia (dysphagia lusoria).   3.  No cervical lymphadenopathy.    These results were communicated to NARCISO FORTE at 7/17/2023 4:48 AM Mountain Time.       Electronically signed by:  Royal Lamb DO    7/17/2023 4:48 AM Mountain Time              Assessment & Plan   Proceed with scope and MAC anesthesia    Proceed with EGD, foreign body removal, and dilation    German Wells MD  07/17/23  12:06 EDT

## 2023-07-17 NOTE — OP NOTE
ESOPHAGOGASTRODUODENOSCOPY  Procedure Report    Patient Name:  Pierre Rossi  YOB: 1970    Date of Surgery:  7/17/2023     Indications: Food impaction of the esophagus  Zenker's diverticulum  Dysphagia    Pre-op Diagnosis:   Food impaction of esophagus [T18.128A]         Post-op Diagnosis:  Large posterior Zenker's diverticulum filled with food status post food removal as described  Savory dilation of the esophagus to 48 Italian  Otherwise normal upper GI endoscopy of the stomach and duodenum      Procedure(s):  ESOPHAGOGASTRODUODENOSCOPY    Staff:  Surgeon(s):  German Wells MD         Anesthesia: General    Estimated Blood Loss: None  Implants:    Nothing was implanted during the procedure    Specimen:          None    Complications:  No immediate    Description of Procedure:  Informed consent was obtained from the patient.  They were placed in the left lateral decubitus position and general endotracheal anesthesia was administered by anesthesia while being monitored continuously throughout the procedure.  The Olympus endoscope was introduced into the esophagus and was advanced quickly under direct vision to the second portion of the duodenum which was normal as was the duodenal bulb.  The pylorus is widely patent.  There is no ulceration or outlet obstruction.  The antrum body fundus and cardia were inspected thoroughly including retroflexion views demonstrating no gastric ulcers or erosions varices or hiatal hernia.  The squamocolumnar line is at the GE junction without erosive esophagitis and the remaining esophageal mucosa appeared normal.  In the hypopharynx the ET tube is identified in appropriate position within the trachea.  Posterior to the esophageal inlet there is a moderately large Zenker's diverticulum filled with a moderate amount of food.  Utilizing a rat-tooth forcep and Thibodeaux net, the Zenker's diverticulum was completely cleaned out of all food particles.  There was no  PHYSICAL THERAPY DAILY VISIT    DIAGNOSIS:  1. Acute right-sided low back pain with right-sided sciatica  2. Difficulty walking  3. Right leg weakness      INSURANCE BENEFITS:   Benefit maximums: 60 SEPERATE Visits per calendar year (0 visits used this year to date)    PHYSICIAN RECOMMENDATIONS: Evaluate and Treat    ATTENDANCE: Patient has been seen for 8 visits between 1/14/2019 and  2/13/2019  Progress Summary due by 2/13/19.    SUBJECTIVE:  Reports no increased pain after last visit, just fatigue. Saw pain management doctor yesterday who gave him an injection in the muscle in his back and recommended epidural steroid injections in the spine which is in the process of getting approved by his insurance and then he will be able to schedule.    OBJECTIVE:   Short Term Goals: (3 weeks)  1. Patient will be independent in home exercise program  (1/23/2019: Progressing towards. Minimal cueing required))  2. Patient will tolerate lumbopelvic retraining exercises without cues (1/16/2019: Progressing towards with cues needed for initial exercises)  3. Patient will report 50% improvement radicular symptoms (1/21/2019: Unmet. Symptoms about the same)  4. Patient will report decreased leg and back pain to 5/10 at worst (2/5/2019: Met with new medication. See subjective)    Long Term Goals: (6 weeks)  1. Patient will report reduction in pain to no greater than 2/10 for improved ability to self manage symptoms  2. Patient will have improved hip strength by at least 1/2 grade (2/13/2019: Progressing towards with continuing to work on strengthening)  3. Patient will report centralization of symptoms  4. Patient will demonstrate full and pain-free lumbar spine active range of motion.   5. Patient will demonstrate normal gait pattern on smooth surfaces without cueing. (2/11/2019: Progressing towards. No antalgic pattern noted but slight hesitancy still noted with gait)    From initial evaluation/updated measurements as noted below:    Observation/Posture: mild lateral shift to the left     Range of Motion:  Active lumbar range of motion in degrees (*=pain):    1/14/2019  Quality of Motion   Forward Bending Fingertips to ankles* Increases back and leg pain   Right Side Bending 15*    Left Side Bending 20    Right Rotation 100%*    Left Rotation 100%    Backward Bending 15* Increases back and leg pain more than flexion       Manual Muscle Test: Strength per manual muscle test (*=pain):   Right  1/14/2019  Left  1/14/2019      Hip Flexion 4+/5* 5/5   Hip Extension with knee extended N/T DUE TO  Pain N/T DUE TO  Pain   Hip Abduction N/T DUE TO  Pain N/T DUE TO  Pain   Knee Flexion 4+/5 5/5   Knee Extension 4+/5* 5/5   Ankle Dorsiflexion 4/5* 5/5   Ankle plantarflexion 4+/5* 5/5   1st Toe Extension 4+/5 5/5       Neurological:   Sensation -   Light Touch diminished:   right L2 (medial mid thigh), S1 (heel)  Hyperaesthesia:  right L5 (dorsum of foot)    Reflexes    Right  1/14/2019  Left  1/14/2019    Knees 2+ 2+   Ankles 2+ Unable to ellicit     Palpation:  Tenderness and reproduction of symptoms down right leg in piriformis, sacroiliac joint, L5/S1    Joint Mobility:   Pain in back and right leg with spring testing L5 and L4     Lumbar Special Tests: 1/14/2019   Right SHANIQUA test:  Some increased pain in leg  left SHANIQUA test:  Negative  Right piriformis test: Positive  left piriformis test: Negative  Supine straight leg raise right:  Positive  Supine straight leg raise left: Negative  Slump test: Positive on right and left       Functional Assessment:   Gait:  Mild antalgic gait pattern Decreased weight bearing right lower extremity   Stairs:  Very difficult at times  Functional Squats: unable to tolerate    TREATMENT TODAY:   Time in:  12:52            Time out:  1:52    Modalities - Electrical Stimulation to  decrease pain:  24693 (unattended non-Medicare) to lumbar spine in prone with ice   Cold Pack to  decrease pain: 93009 - 15 minutes  significant mucosal trauma noted in the diverticulum after the food was cleared.  Next the scope was readvanced into the antrum and a spring-tipped guidewire was passed and maintained in position upon withdrawal of the scope.  Serial over-the-wire dilations were performed with 45 and 48 savary dilators both of which entered the stomach without significant resistance and no significant trauma at the cricopharyngeus on second look endoscopy.  The scope was removed and he tolerated the procedure well returned to recovery in good condition.    Impression:  Zenker's diverticulum impacted with food status post clearance and removal as described  Dysphagia status post esophageal dilation to 48 Citizen of [place]  Normal stomach and duodenum    Recommendations:  We will recommend alternating a bite of food with a drink of water to help clear the Zenker's at mealtime  Recommend proceeding with ENT evaluation for endoscopic diverticuli to me as soon as possible  Call for any postop problems  We appreciate the referral thank you      German Wells MD     Date: 7/17/2023  Time: 12:06 EDT           Manual Therapy to decrease myofascial tightness and improve soft tissue and joint mobility:  97140 x 1 units -  15 minutes  - myofascial release right iliac crest, sacral sulcus, through lumbar spine, piriformis, deep hip external rotators  - Grade I-II Joint mobilization L4-5 - defer   - Myofascial release right quadratus lumborum in sidelying  - right L4/5-L5/S1 gapping in left sidelying - defer     Therapeutic Exercise to improve flexibility, increase strength and instruct in a home exercsie program:  97110 x 2 units -  24 minutes  With transverse abdominus:  - prone hip extension with pillow under abdomen x 10  - prone glut med lifts x 10  - Pilates ring same side hold 10 x 5 seconds  - bent knee fallout with orange theraband x 10  - marching up,up,down,down x 10 leading each leg  - bridges x 10  - quadruped gentle knee lift for multifidus activation 5 x 5 second holds - cues for hip shift  - quadruped alternating leg lifts x 10  - standing hip abduction and extension orange theraband x 10 each bilateral - cues for trunk alignment  - sidesteps orange theraband x 2 laps  - Standing hip flexor stretch at step 30 seconds x 2     Precautions: none     Home exercise program (last updated (2/13/2019): Patient issued written home exercise program.  Patient demonstrated exercises correctly and was instructed to call with questions.   2-4 times a day:  - ice 10-15 minutes  - transverse abdominus isometrics 10 x 5 second holds  1 time a day:  With transverse abdominus:  - marching up,up,down,down x 10 leading each leg  - bridges x 10  - quadruped alternating leg lifts x 10  - standing hip abduction and extension orange theraband x 10 each bilateral  2 times a day:  - Standing hip flexor stretch at step 30 seconds x 2     ASSESSMENT/TREATMENT RESPONSE:  Patient's response to treatment:  Patient demonstrates overall improvement of strength as able to maintain neutral spine and posterior pelvic tilt with less cueing and  less fatigue. Patient still fatigued quickly in hips with standing exercises.      Functional improvement noted:  None noted today     Prognosis for meeting goals:  fair.   Treatment will consist of ultrasound, electrical stimulation, hot packs and cold packs, home program, manual therapy, neuromuscular re-education, therapeutic activities and therapeutic exercise.  Treatment plan was discussed with the patient and verbal consent was obtained.    Remaining Impairment Requiring Continued Treatment: decreased range of motion, decreased flexibility, decreased strength, pain and impairment of functional performance        PLAN:   Patient will be seen 2 times per week for 6 weeks.     Therapist signature: Electronically signed by Merle Dinero, PT 2/13/2019

## 2023-07-17 NOTE — FSED PROVIDER NOTE
Subjective   History of Present Illness  52 yom complains of throat pain for the past several days. He reports last night he was eating a hamburger and he felt like a part of it became stuck. It eventually went down but the patient is concerned because he has pain on that side of this throat. He also complains of discomfort with urination.     Review of Systems   Constitutional: Negative.    HENT:  Positive for sore throat and trouble swallowing. Negative for voice change.    Respiratory: Negative.  Negative for cough, choking and shortness of breath.    Cardiovascular: Negative.  Negative for chest pain.   Gastrointestinal: Negative.  Negative for abdominal pain, nausea and vomiting.   Genitourinary:  Positive for dysuria.   Musculoskeletal: Negative.    Skin: Negative.    All other systems reviewed and are negative.    Past Medical History:   Diagnosis Date    Asthma     Constipation     GERD (gastroesophageal reflux disease)     Hyperlipidemia     Hypertension     Prediabetes     SVT (supraventricular tachycardia)        Allergies   Allergen Reactions    Codeine Other (See Comments)     Hypotension, tachycardia       Past Surgical History:   Procedure Laterality Date    APPENDECTOMY      CHOLECYSTECTOMY      HERNIA REPAIR      ROTATOR CUFF REPAIR         History reviewed. No pertinent family history.    Social History     Socioeconomic History    Marital status:    Tobacco Use    Smoking status: Former     Types: Cigarettes   Substance and Sexual Activity    Alcohol use: Never    Drug use: Never           Objective   Physical Exam  Vitals reviewed.   Constitutional:       Appearance: Normal appearance. He is obese.   HENT:      Head: Normocephalic and atraumatic.      Nose: Nose normal.      Mouth/Throat:      Mouth: Mucous membranes are moist.      Pharynx: Oropharynx is clear. No oropharyngeal exudate or posterior oropharyngeal erythema.   Eyes:      Extraocular Movements: Extraocular movements intact.       Pupils: Pupils are equal, round, and reactive to light.   Cardiovascular:      Rate and Rhythm: Normal rate and regular rhythm.      Pulses: Normal pulses.      Heart sounds: Normal heart sounds.   Pulmonary:      Effort: Pulmonary effort is normal.      Breath sounds: Normal breath sounds.   Abdominal:      Palpations: Abdomen is soft.      Tenderness: There is no abdominal tenderness.   Musculoskeletal:      Cervical back: Normal range of motion and neck supple.   Skin:     General: Skin is warm and dry.   Neurological:      General: No focal deficit present.      Mental Status: He is alert and oriented to person, place, and time.       Procedures           ED Course  ED Course as of 07/17/23 0852   Mon Jul 17, 2023   0724 Paged ENT [BM]   0817 Spoke with [facility], would like the patient sent to Endoscopy. Patient is agreeable with this plan.  Pt is able to to speak in full sentences, is not drooling, no shortness of breath or chest pain.  [BM]      ED Course User Index  [BM] Ema Mckeon MD         Spoke with Kelly (GI at [facility] pt can be discharged to follow-up with Endoscopy.                                  Medical Decision Making  No history of immunocompromise. Nontoxic appearance. Patient euvolemic with no trismus. No airway compromise. No change in voice, exudates, enlarged lymph nodes. Able to tolerate PO. Given History and Exam I have low suspicion for this presentation being caused by PTA, RPA, Ludwigs angina, Epiglottitis or Bacterial Tracheitis, EBV, acute HIV, or Strep throat. The patient has a possible food bolus on CT. The patient will be transferred to [facility].     Problems Addressed:  Dysphagia, unspecified type: complicated acute illness or injury  Dysuria: complicated acute illness or injury  Esophageal obstruction due to food impaction: complicated acute illness or injury    Amount and/or Complexity of Data Reviewed  Labs: ordered.  Radiology:  ordered.    Risk  Prescription drug management.        Final diagnoses:   Dysuria   Dysphagia, unspecified type   Esophageal obstruction due to food impaction       ED Disposition  ED Disposition       ED Disposition   Transfer to Another Facility     Condition   --    Comment   --               No follow-up provider specified.       Medication List      No changes were made to your prescriptions during this visit.

## 2023-07-17 NOTE — Clinical Note
To [facility]. Patient given discharge instructions and verbalized understanding. [facility]

## 2023-12-07 ENCOUNTER — APPOINTMENT (OUTPATIENT)
Dept: CT IMAGING | Facility: HOSPITAL | Age: 53
End: 2023-12-07
Payer: COMMERCIAL

## 2023-12-07 ENCOUNTER — HOSPITAL ENCOUNTER (EMERGENCY)
Facility: HOSPITAL | Age: 53
Discharge: HOME | End: 2023-12-07
Attending: EMERGENCY MEDICINE
Payer: COMMERCIAL

## 2023-12-07 VITALS
BODY MASS INDEX: 32.33 KG/M2 | OXYGEN SATURATION: 99 % | SYSTOLIC BLOOD PRESSURE: 139 MMHG | HEART RATE: 89 BPM | RESPIRATION RATE: 16 BRPM | DIASTOLIC BLOOD PRESSURE: 85 MMHG | HEIGHT: 75 IN | WEIGHT: 260 LBS | TEMPERATURE: 99.1 F

## 2023-12-07 DIAGNOSIS — K57.92 DIVERTICULITIS: Primary | ICD-10-CM

## 2023-12-07 LAB
ALBUMIN SERPL-MCNC: 4.2 G/DL (ref 3.5–5.2)
ALBUMIN/GLOB SERPL: 1.8 G/DL
ALP SERPL-CCNC: 50 U/L (ref 39–117)
ALT SERPL W P-5'-P-CCNC: 13 U/L (ref 1–41)
ANION GAP SERPL CALCULATED.3IONS-SCNC: 7.4 MMOL/L (ref 5–15)
AST SERPL-CCNC: 23 U/L (ref 1–40)
BASOPHILS # BLD AUTO: 0.02 10*3/MM3 (ref 0–0.2)
BASOPHILS NFR BLD AUTO: 0.3 % (ref 0–1.5)
BILIRUB SERPL-MCNC: 0.4 MG/DL (ref 0–1.2)
BILIRUB UR QL STRIP: NEGATIVE
BUN SERPL-MCNC: 9 MG/DL (ref 6–20)
BUN/CREAT SERPL: 8.6 (ref 7–25)
CALCIUM SPEC-SCNC: 9.1 MG/DL (ref 8.6–10.5)
CHLORIDE SERPL-SCNC: 102 MMOL/L (ref 98–107)
CLARITY UR: CLEAR
CO2 SERPL-SCNC: 27.6 MMOL/L (ref 22–29)
COLOR UR: ABNORMAL
CREAT SERPL-MCNC: 1.05 MG/DL (ref 0.76–1.27)
DEPRECATED RDW RBC AUTO: 44.4 FL (ref 37–54)
EGFRCR SERPLBLD CKD-EPI 2021: 83.8 ML/MIN/1.73
EOSINOPHIL # BLD AUTO: 0.03 10*3/MM3 (ref 0–0.4)
EOSINOPHIL NFR BLD AUTO: 0.4 % (ref 0.3–6.2)
ERYTHROCYTE [DISTWIDTH] IN BLOOD BY AUTOMATED COUNT: 13.1 % (ref 12.3–15.4)
GLOBULIN UR ELPH-MCNC: 2.4 GM/DL
GLUCOSE SERPL-MCNC: 90 MG/DL (ref 65–99)
GLUCOSE UR STRIP-MCNC: NEGATIVE MG/DL
HCT VFR BLD AUTO: 41.7 % (ref 37.5–51)
HGB BLD-MCNC: 13.3 G/DL (ref 13–17.7)
HGB UR QL STRIP.AUTO: ABNORMAL
IMM GRANULOCYTES # BLD AUTO: 0 10*3/MM3 (ref 0–0.05)
IMM GRANULOCYTES NFR BLD AUTO: 0 % (ref 0–0.5)
KETONES UR QL STRIP: NEGATIVE
LEUKOCYTE ESTERASE UR QL STRIP.AUTO: NEGATIVE
LIPASE SERPL-CCNC: 9 U/L (ref 13–60)
LYMPHOCYTES # BLD AUTO: 2.59 10*3/MM3 (ref 0.7–3.1)
LYMPHOCYTES NFR BLD AUTO: 33 % (ref 19.6–45.3)
MCH RBC QN AUTO: 28.9 PG (ref 26.6–33)
MCHC RBC AUTO-ENTMCNC: 31.9 G/DL (ref 31.5–35.7)
MCV RBC AUTO: 90.7 FL (ref 79–97)
MONOCYTES # BLD AUTO: 0.62 10*3/MM3 (ref 0.1–0.9)
MONOCYTES NFR BLD AUTO: 7.9 % (ref 5–12)
NEUTROPHILS NFR BLD AUTO: 4.59 10*3/MM3 (ref 1.7–7)
NEUTROPHILS NFR BLD AUTO: 58.4 % (ref 42.7–76)
NITRITE UR QL STRIP: NEGATIVE
PH UR STRIP.AUTO: 6 [PH] (ref 5–8)
PLATELET # BLD AUTO: 233 10*3/MM3 (ref 140–450)
PMV BLD AUTO: 9.9 FL (ref 6–12)
POTASSIUM SERPL-SCNC: 3.7 MMOL/L (ref 3.5–5.2)
PROT SERPL-MCNC: 6.6 G/DL (ref 6–8.5)
PROT UR QL STRIP: NEGATIVE
RBC # BLD AUTO: 4.6 10*6/MM3 (ref 4.14–5.8)
SODIUM SERPL-SCNC: 137 MMOL/L (ref 136–145)
SP GR UR STRIP: <=1.005 (ref 1–1.03)
UROBILINOGEN UR QL STRIP: ABNORMAL
WBC NRBC COR # BLD AUTO: 7.85 10*3/MM3 (ref 3.4–10.8)

## 2023-12-07 PROCEDURE — 85025 COMPLETE CBC W/AUTO DIFF WBC: CPT | Performed by: NURSE PRACTITIONER

## 2023-12-07 PROCEDURE — 25510000001 IOPAMIDOL PER 1 ML: Performed by: EMERGENCY MEDICINE

## 2023-12-07 PROCEDURE — 96374 THER/PROPH/DIAG INJ IV PUSH: CPT

## 2023-12-07 PROCEDURE — 81001 URINALYSIS AUTO W/SCOPE: CPT | Performed by: NURSE PRACTITIONER

## 2023-12-07 PROCEDURE — 96361 HYDRATE IV INFUSION ADD-ON: CPT

## 2023-12-07 PROCEDURE — 96375 TX/PRO/DX INJ NEW DRUG ADDON: CPT

## 2023-12-07 PROCEDURE — 25010000002 KETOROLAC TROMETHAMINE PER 15 MG: Performed by: NURSE PRACTITIONER

## 2023-12-07 PROCEDURE — 80053 COMPREHEN METABOLIC PANEL: CPT | Performed by: NURSE PRACTITIONER

## 2023-12-07 PROCEDURE — 99285 EMERGENCY DEPT VISIT HI MDM: CPT

## 2023-12-07 PROCEDURE — 25010000002 ONDANSETRON PER 1 MG: Performed by: NURSE PRACTITIONER

## 2023-12-07 PROCEDURE — 83690 ASSAY OF LIPASE: CPT | Performed by: NURSE PRACTITIONER

## 2023-12-07 PROCEDURE — 25810000003 SODIUM CHLORIDE 0.9 % SOLUTION: Performed by: NURSE PRACTITIONER

## 2023-12-07 PROCEDURE — 74177 CT ABD & PELVIS W/CONTRAST: CPT

## 2023-12-07 RX ORDER — ONDANSETRON 4 MG/1
4 TABLET, ORALLY DISINTEGRATING ORAL EVERY 6 HOURS PRN
Qty: 28 TABLET | Refills: 0 | Status: SHIPPED | OUTPATIENT
Start: 2023-12-07 | End: 2023-12-14

## 2023-12-07 RX ORDER — MELATONIN
1000 DAILY
COMMUNITY
Start: 2023-11-30 | End: 2024-11-29

## 2023-12-07 RX ORDER — DILTIAZEM HYDROCHLORIDE 120 MG/1
120 CAPSULE, COATED, EXTENDED RELEASE ORAL DAILY
COMMUNITY
Start: 2023-11-29

## 2023-12-07 RX ORDER — ONDANSETRON 2 MG/ML
4 INJECTION INTRAMUSCULAR; INTRAVENOUS ONCE
Status: COMPLETED | OUTPATIENT
Start: 2023-12-07 | End: 2023-12-07

## 2023-12-07 RX ORDER — CIPROFLOXACIN 500 MG/1
500 TABLET, FILM COATED ORAL 2 TIMES DAILY
Qty: 20 TABLET | Refills: 0 | Status: SHIPPED | OUTPATIENT
Start: 2023-12-07 | End: 2023-12-17

## 2023-12-07 RX ORDER — ATORVASTATIN CALCIUM 20 MG/1
20 TABLET, FILM COATED ORAL DAILY
COMMUNITY
Start: 2023-11-30

## 2023-12-07 RX ORDER — SODIUM CHLORIDE 0.9 % (FLUSH) 0.9 %
10 SYRINGE (ML) INJECTION AS NEEDED
Status: DISCONTINUED | OUTPATIENT
Start: 2023-12-07 | End: 2023-12-07 | Stop reason: HOSPADM

## 2023-12-07 RX ORDER — HYDROCODONE BITARTRATE AND ACETAMINOPHEN 5; 325 MG/1; MG/1
1 TABLET ORAL EVERY 6 HOURS PRN
Qty: 12 TABLET | Refills: 0 | Status: SHIPPED | OUTPATIENT
Start: 2023-12-07 | End: 2023-12-10

## 2023-12-07 RX ORDER — METRONIDAZOLE 500 MG/1
500 TABLET ORAL 3 TIMES DAILY
Qty: 30 TABLET | Refills: 0 | Status: SHIPPED | OUTPATIENT
Start: 2023-12-07 | End: 2023-12-17

## 2023-12-07 RX ORDER — TAMSULOSIN HYDROCHLORIDE 0.4 MG/1
0.4 CAPSULE ORAL DAILY
COMMUNITY
Start: 2023-11-30

## 2023-12-07 RX ORDER — KETOROLAC TROMETHAMINE 15 MG/ML
15 INJECTION, SOLUTION INTRAMUSCULAR; INTRAVENOUS ONCE
Status: COMPLETED | OUTPATIENT
Start: 2023-12-07 | End: 2023-12-07

## 2023-12-07 RX ORDER — PANTOPRAZOLE SODIUM 40 MG/1
40 TABLET, DELAYED RELEASE ORAL DAILY
COMMUNITY
Start: 2023-11-30

## 2023-12-07 RX ADMIN — SODIUM CHLORIDE 1000 ML: 9 INJECTION, SOLUTION INTRAVENOUS at 17:57

## 2023-12-07 RX ADMIN — KETOROLAC TROMETHAMINE 15 MG: 15 INJECTION, SOLUTION INTRAMUSCULAR; INTRAVENOUS at 19:17

## 2023-12-07 RX ADMIN — ONDANSETRON 4 MG: 2 INJECTION INTRAMUSCULAR; INTRAVENOUS at 19:18

## 2023-12-07 RX ADMIN — IOPAMIDOL 100 ML: 755 INJECTION, SOLUTION INTRAVENOUS at 19:03

## 2023-12-07 NOTE — Clinical Note
[place] [address] [address] [place] [address] [place] [address]  Phone: [phone]    Pierre Rossi was seen and treated in our emergency department on 12/7/2023.  He may return to work on 12/09/2023.         Thank you for choosing [facility].    Ginette Guillory APRN

## 2023-12-07 NOTE — FSED PROVIDER NOTE
Subjective   History of Present Illness  The patient is a 53-year-old male who presents to the ER with left lower quadrant abdominal pain history of diverticulitis.  Patient reports pain and discomfort felt the same in the past.  Patient reports pain and discomfort started earlier in the week.  Patient reports that he had soup yesterday and then today he is crackers only.  Patient also reports she has had increased urination and burning with urination. Patient reports he has not had an episode for quit some time.     History provided by:  Patient   used: No        Review of Systems   Gastrointestinal:  Positive for abdominal pain and nausea.   Genitourinary:  Positive for dysuria and frequency.       Past Medical History:   Diagnosis Date    Asthma     Constipation     GERD (gastroesophageal reflux disease)     Hyperlipidemia     Hypertension     Prediabetes     SVT (supraventricular tachycardia)        Allergies   Allergen Reactions    Codeine Other (See Comments)     Hypotension, tachycardia       Past Surgical History:   Procedure Laterality Date    APPENDECTOMY      CHOLECYSTECTOMY      ENDOSCOPY N/A 7/17/2023    Procedure: ESOPHAGOGASTRODUODENOSCOPY with removal of foreign body and wire guided dilation with #45 & #48 bougie;  Surgeon: German Wells MD;  Location: [facility] ENDOSCOPY;  Service: Gastroenterology;  Laterality: N/A;  dysphagia,foreign body and zenker's diverticulum    HERNIA REPAIR      ROTATOR CUFF REPAIR         No family history on file.    Social History     Socioeconomic History    Marital status:    Tobacco Use    Smoking status: Former     Types: Cigarettes   Substance and Sexual Activity    Alcohol use: Never    Drug use: Never           Objective   Physical Exam  Vitals and nursing note reviewed.   Constitutional:       Appearance: Normal appearance. He is well-developed.   HENT:      Head: Normocephalic.      Right Ear: Tympanic membrane, ear canal and  external ear normal.      Left Ear: Tympanic membrane, ear canal and external ear normal.      Nose: Nose normal.      Mouth/Throat:      Lips: Pink.      Mouth: Mucous membranes are moist.      Pharynx: Oropharynx is clear. Uvula midline.   Eyes:      Extraocular Movements: Extraocular movements intact.      Pupils: Pupils are equal, round, and reactive to light.   Cardiovascular:      Rate and Rhythm: Normal rate and regular rhythm.      Heart sounds: Normal heart sounds.   Pulmonary:      Effort: Pulmonary effort is normal.      Breath sounds: Normal breath sounds.   Abdominal:      General: Bowel sounds are normal.      Palpations: Abdomen is soft.      Tenderness:  in the left lower quadrant          Comments: Patient with abdominal pain and tenderness on palpation. Patient reports some nausea.    Skin:     General: Skin is warm and dry.   Neurological:      General: No focal deficit present.      Mental Status: He is alert and oriented to person, place, and time.   Psychiatric:         Mood and Affect: Mood normal.         Behavior: Behavior normal. Behavior is cooperative.         Procedures           ED Course                                           Medical Decision Making  Abdominal exam without peritoneal signs. No evidence of acute abdomen at this time. Well appearing. Given work up, low suspicion for acute hepatobiliary disease (including acute cholecystitis or cholangitis), acute pancreatitis (neg lipase), PUD (including gastric perforation), acute infectious processes (pneumonia, hepatitis, pyelonephritis), acute appendicitis, vascular catastrophe, bowel obstruction, viscus perforation, or testicular torsion.  Presentation not consistent with other acute, emergent causes of abdominal pain at this time.      Amount and/or Complexity of Data Reviewed  Labs: ordered. Decision-making details documented in ED Course.  Radiology: ordered. Decision-making details documented in ED Course.     Details: CT  ABDOMEN PELVIS W CONTRAST     Date of Exam: 12/7/2023 6:50 PM EST     Indication: left side abdominal pain,.     Comparison: CT abdomen pelvis with contrast dated 2/4/2023     Technique: Axial CT images were obtained of the abdomen and pelvis following the uneventful intravenous administration of iodinated contrast. Sagittal and coronal reconstructions were performed.  Automated exposure control and iterative reconstruction   methods were used.           Findings:  Lower Thorax: Lung bases are free of active disease.     Liver: The liver is normal in size and mildly fatty infiltrated. No significant liver lesions.   The hepatic and portal veins are patent.     Biliary system: The gallbladder is surgically absent. There is no biliary dilatation.     Spleen: Spleen is normal size.  No focal splenic lesions.     Pancreas: No focal masses.  No pancreatic duct dilation.No surrounding fluid or inflammatory changes.     Adrenal glands: Unremarkable.     Kidneys, ureters, and urinary bladder: No hydronephrosis.  No nephroureterolithiasis. No suspicious renal lesions. The bladder appears mildly diffusely thick-walled but it is incompletely distended. Correlation with any clinical signs and symptoms of   cystitis is recommended.        Reproductive Organs: Normal for age.     Stomach and Bowel: There is a small hiatal hernia. The stomach and small bowel loops otherwise are unremarkable within the proximal and mid descending colon, there mild focal wall thickening with fat stranding and a small amount of fluid in the the   paracolic gutter. There is mild diverticulosis in this area. When compared with the prior study there was a more extensive inflammatory process in the mid and distal descending colon. Residual or recurrent diverticulitis can have this appearance. No   loculated fluid collections or free intraperitoneal air. No focal colon dilation.  Peritoneum: No free air or free fluid.      Lymph nodes: No pathologically  enlarged lymph nodes.     Abdominal aorta: No aneurysmal dilation. No significant atherosclerotic disease.      Soft tissues: There is mesh from previous ventral wall hernia repair at the level of the umbilicus. A recurrent hernia is not demonstrated.     Osseous structures: No acute bony abnormality or  aggressive focal lytic or sclerotic osseous lesions.              IMPRESSION:  Impression:  1.There is mild residual or recurrent diverticulitis in the proximal and mid descending colon. No loculated fluid collections or free intraperitoneal air.  2.The bladder appears mildly diffusely thick-walled but it is incompletely distended. Correlation with any clinical signs and symptoms of cystitis is recommended.  3.Mild fatty infiltration of the liver and other incidental ancillary findings are described above.      Risk  Prescription drug management.        Final diagnoses:   Diverticulitis       ED Disposition  ED Disposition       ED Disposition   Discharge    Condition   Stable    Comment   --               Madi Mejias, APRN  [address] [address]  [phone]    Schedule an appointment as soon as possible for a visit in 1 week  for re-evaluation and treatment         Medication List        New Prescriptions      ciprofloxacin 500 MG tablet  Commonly known as: CIPRO  Take 1 tablet by mouth 2 (Two) Times a Day for 10 days.     metroNIDAZOLE 500 MG tablet  Commonly known as: FLAGYL  Take 1 tablet by mouth 3 (Three) Times a Day for 10 days.     ondansetron ODT 4 MG disintegrating tablet  Commonly known as: ZOFRAN-ODT  Place 1 tablet on the tongue Every 6 (Six) Hours As Needed for Nausea or Vomiting for up to 7 days.            Changed      * HYDROcodone-acetaminophen 5-325 MG per tablet  Commonly known as: NORCO  Take 1 tablet by mouth Every 6 (Six) Hours As Needed for Moderate Pain .  What changed: Another medication with the same name was added. Make sure you understand how and when  to take each.     * HYDROcodone-acetaminophen 5-325 MG per tablet  Commonly known as: NORCO  Take 1 tablet by mouth Every 6 (Six) Hours As Needed for Mild Pain or Moderate Pain for up to 3 days.  What changed: You were already taking a medication with the same name, and this prescription was added. Make sure you understand how and when to take each.           * This list has 2 medication(s) that are the same as other medications prescribed for you. Read the directions carefully, and ask your doctor or other care provider to review them with you.                   Where to Get Your Medications        These medications were sent to [id number] - [place], [place] - [address] [place] - [phone] [facility] [phone]   [address] [place] [address]      Phone: [phone]   ciprofloxacin 500 MG tablet  HYDROcodone-acetaminophen 5-325 MG per tablet  metroNIDAZOLE 500 MG tablet  ondansetron ODT 4 MG disintegrating tablet

## 2023-12-08 LAB
BACTERIA UR QL AUTO: NORMAL /HPF
HYALINE CASTS UR QL AUTO: NORMAL /LPF
RBC # UR STRIP: NORMAL /HPF
REF LAB TEST METHOD: NORMAL
SQUAMOUS #/AREA URNS HPF: NORMAL /HPF
WBC # UR STRIP: NORMAL /HPF

## 2023-12-08 NOTE — ED NOTES
Pt educated on d/c instructions and f/u in 1 wk.   
Pt resting in bed , assisted up to go to bathroom. Tolerated meds well. VSS   
PAST MEDICAL HISTORY:  Adrenal insufficiency, primary     Chronic abdominal pain     Knee effusion     Osteoporosis     Porphyria     Staph infection 8/2017

## 2023-12-08 NOTE — DISCHARGE INSTRUCTIONS
Follow-up with primary care for further evaluation and treatment in approximately 1 week.  Call for follow-up appointment    Medications as prescribed, Cipro, Flagyl, Zofran as needed for nausea, hydrocodone as needed for pain    Clear liquid diet for the next 24 hours and advance as tolerated.  Avoid anything that has seeds, avoid any type of nuts.

## (undated) DEVICE — NET RESCUENET F/B RETRV

## (undated) DEVICE — FRCP GRASP RESCUE RAT/TOOTH ALLGTR 8X230CM

## (undated) DEVICE — PK ENDO GI 50

## (undated) DEVICE — BITEBLOCK ENDO W/STRAP 60F A/ LF DISP